# Patient Record
Sex: FEMALE | Race: WHITE | NOT HISPANIC OR LATINO | Employment: UNEMPLOYED | URBAN - METROPOLITAN AREA
[De-identification: names, ages, dates, MRNs, and addresses within clinical notes are randomized per-mention and may not be internally consistent; named-entity substitution may affect disease eponyms.]

---

## 2021-09-28 ENCOUNTER — HOSPITAL ENCOUNTER (OUTPATIENT)
Dept: RADIOLOGY | Facility: MEDICAL CENTER | Age: 51
End: 2021-09-28

## 2021-09-28 ENCOUNTER — APPOINTMENT (OUTPATIENT)
Dept: RADIOLOGY | Facility: MEDICAL CENTER | Age: 51
End: 2021-09-28
Attending: EMERGENCY MEDICINE
Payer: MEDICAID

## 2021-09-28 ENCOUNTER — HOSPITAL ENCOUNTER (OUTPATIENT)
Facility: MEDICAL CENTER | Age: 51
End: 2021-10-01
Attending: EMERGENCY MEDICINE | Admitting: STUDENT IN AN ORGANIZED HEALTH CARE EDUCATION/TRAINING PROGRAM
Payer: MEDICAID

## 2021-09-28 ENCOUNTER — APPOINTMENT (OUTPATIENT)
Dept: RADIOLOGY | Facility: MEDICAL CENTER | Age: 51
End: 2021-09-28
Attending: STUDENT IN AN ORGANIZED HEALTH CARE EDUCATION/TRAINING PROGRAM
Payer: MEDICAID

## 2021-09-28 DIAGNOSIS — R53.1 ACUTE LEFT-SIDED WEAKNESS: Primary | ICD-10-CM

## 2021-09-28 PROBLEM — F32.A DEPRESSION: Status: ACTIVE | Noted: 2021-09-28

## 2021-09-28 PROBLEM — F43.29 STRESS AND ADJUSTMENT REACTION: Status: ACTIVE | Noted: 2021-09-28

## 2021-09-28 PROBLEM — R20.2 PARESTHESIA OF LEFT UPPER AND LOWER EXTREMITY: Status: ACTIVE | Noted: 2021-09-28

## 2021-09-28 PROBLEM — Z86.69 HISTORY OF MIGRAINE HEADACHES: Status: ACTIVE | Noted: 2021-09-28

## 2021-09-28 PROBLEM — G40.909 SEIZURE DISORDER (HCC): Status: ACTIVE | Noted: 2021-09-28

## 2021-09-28 PROBLEM — R29.818 TRANSIENT NEUROLOGICAL SYMPTOMS: Status: ACTIVE | Noted: 2021-09-28

## 2021-09-28 PROBLEM — J44.89 COPD WITH ASTHMA (HCC): Status: ACTIVE | Noted: 2021-09-28

## 2021-09-28 PROBLEM — I50.9 CHF (CONGESTIVE HEART FAILURE) (HCC): Status: ACTIVE | Noted: 2021-09-28

## 2021-09-28 PROBLEM — R07.9 PAIN IN THE CHEST: Status: ACTIVE | Noted: 2021-09-28

## 2021-09-28 PROBLEM — F17.200 SMOKING: Status: ACTIVE | Noted: 2021-09-28

## 2021-09-28 PROBLEM — D75.1 POLYCYTHEMIA: Status: ACTIVE | Noted: 2021-09-28

## 2021-09-28 PROBLEM — R10.12 LEFT UPPER QUADRANT ABDOMINAL PAIN: Status: ACTIVE | Noted: 2021-09-28

## 2021-09-28 LAB
ABO GROUP BLD: NORMAL
APTT PPP: 27.3 SEC (ref 24.7–36)
BASOPHILS # BLD AUTO: 0.8 % (ref 0–1.8)
BASOPHILS # BLD: 0.07 K/UL (ref 0–0.12)
BLD GP AB SCN SERPL QL: NORMAL
EKG IMPRESSION: NORMAL
EOSINOPHIL # BLD AUTO: 0.22 K/UL (ref 0–0.51)
EOSINOPHIL NFR BLD: 2.5 % (ref 0–6.9)
ERYTHROCYTE [DISTWIDTH] IN BLOOD BY AUTOMATED COUNT: 46.7 FL (ref 35.9–50)
GLUCOSE BLD-MCNC: 104 MG/DL (ref 65–99)
HCT VFR BLD AUTO: 51.5 % (ref 37–47)
HGB BLD-MCNC: 17.5 G/DL (ref 12–16)
IMM GRANULOCYTES # BLD AUTO: 0.08 K/UL (ref 0–0.11)
IMM GRANULOCYTES NFR BLD AUTO: 0.9 % (ref 0–0.9)
INR PPP: 0.9 (ref 0.87–1.13)
LYMPHOCYTES # BLD AUTO: 2.68 K/UL (ref 1–4.8)
LYMPHOCYTES NFR BLD: 30.4 % (ref 22–41)
MCH RBC QN AUTO: 31.6 PG (ref 27–33)
MCHC RBC AUTO-ENTMCNC: 34 G/DL (ref 33.6–35)
MCV RBC AUTO: 93.1 FL (ref 81.4–97.8)
MONOCYTES # BLD AUTO: 0.57 K/UL (ref 0–0.85)
MONOCYTES NFR BLD AUTO: 6.5 % (ref 0–13.4)
NEUTROPHILS # BLD AUTO: 5.21 K/UL (ref 2–7.15)
NEUTROPHILS NFR BLD: 58.9 % (ref 44–72)
NRBC # BLD AUTO: 0 K/UL
NRBC BLD-RTO: 0 /100 WBC
PLATELET # BLD AUTO: 218 K/UL (ref 164–446)
PMV BLD AUTO: 11.3 FL (ref 9–12.9)
PROTHROMBIN TIME: 11.9 SEC (ref 12–14.6)
RBC # BLD AUTO: 5.53 M/UL (ref 4.2–5.4)
RH BLD: NORMAL
WBC # BLD AUTO: 8.8 K/UL (ref 4.8–10.8)

## 2021-09-28 PROCEDURE — 99999 PR NO CHARGE: CPT | Performed by: STUDENT IN AN ORGANIZED HEALTH CARE EDUCATION/TRAINING PROGRAM

## 2021-09-28 PROCEDURE — 0042T CT-CEREBRAL PERFUSION ANALYSIS: CPT

## 2021-09-28 PROCEDURE — 71045 X-RAY EXAM CHEST 1 VIEW: CPT

## 2021-09-28 PROCEDURE — 99285 EMERGENCY DEPT VISIT HI MDM: CPT

## 2021-09-28 PROCEDURE — 700111 HCHG RX REV CODE 636 W/ 250 OVERRIDE (IP): Performed by: STUDENT IN AN ORGANIZED HEALTH CARE EDUCATION/TRAINING PROGRAM

## 2021-09-28 PROCEDURE — 93005 ELECTROCARDIOGRAM TRACING: CPT | Performed by: EMERGENCY MEDICINE

## 2021-09-28 PROCEDURE — 86850 RBC ANTIBODY SCREEN: CPT

## 2021-09-28 PROCEDURE — A9270 NON-COVERED ITEM OR SERVICE: HCPCS | Performed by: STUDENT IN AN ORGANIZED HEALTH CARE EDUCATION/TRAINING PROGRAM

## 2021-09-28 PROCEDURE — 93005 ELECTROCARDIOGRAM TRACING: CPT

## 2021-09-28 PROCEDURE — 94760 N-INVAS EAR/PLS OXIMETRY 1: CPT

## 2021-09-28 PROCEDURE — G0378 HOSPITAL OBSERVATION PER HR: HCPCS

## 2021-09-28 PROCEDURE — 99245 OFF/OP CONSLTJ NEW/EST HI 55: CPT | Performed by: PSYCHIATRY & NEUROLOGY

## 2021-09-28 PROCEDURE — 85025 COMPLETE CBC W/AUTO DIFF WBC: CPT

## 2021-09-28 PROCEDURE — 82962 GLUCOSE BLOOD TEST: CPT

## 2021-09-28 PROCEDURE — 86901 BLOOD TYPING SEROLOGIC RH(D): CPT

## 2021-09-28 PROCEDURE — 85730 THROMBOPLASTIN TIME PARTIAL: CPT

## 2021-09-28 PROCEDURE — 700102 HCHG RX REV CODE 250 W/ 637 OVERRIDE(OP): Performed by: STUDENT IN AN ORGANIZED HEALTH CARE EDUCATION/TRAINING PROGRAM

## 2021-09-28 PROCEDURE — 86900 BLOOD TYPING SEROLOGIC ABO: CPT

## 2021-09-28 PROCEDURE — 96374 THER/PROPH/DIAG INJ IV PUSH: CPT | Mod: XU

## 2021-09-28 PROCEDURE — 85610 PROTHROMBIN TIME: CPT

## 2021-09-28 PROCEDURE — 36415 COLL VENOUS BLD VENIPUNCTURE: CPT

## 2021-09-28 PROCEDURE — 96375 TX/PRO/DX INJ NEW DRUG ADDON: CPT | Mod: XU

## 2021-09-28 RX ORDER — ASPIRIN 325 MG
325 TABLET ORAL DAILY
Status: DISCONTINUED | OUTPATIENT
Start: 2021-09-28 | End: 2021-10-01 | Stop reason: HOSPADM

## 2021-09-28 RX ORDER — IBUPROFEN 800 MG/1
800 TABLET ORAL 3 TIMES DAILY PRN
Status: DISCONTINUED | OUTPATIENT
Start: 2021-10-02 | End: 2021-09-29

## 2021-09-28 RX ORDER — LORAZEPAM 0.5 MG/1
0.5 TABLET ORAL EVERY 6 HOURS PRN
COMMUNITY

## 2021-09-28 RX ORDER — PROPRANOLOL HYDROCHLORIDE 20 MG/1
20 TABLET ORAL 2 TIMES DAILY
COMMUNITY

## 2021-09-28 RX ORDER — OMEPRAZOLE 20 MG/1
20 CAPSULE, DELAYED RELEASE ORAL DAILY
Status: DISCONTINUED | OUTPATIENT
Start: 2021-09-29 | End: 2021-10-01 | Stop reason: HOSPADM

## 2021-09-28 RX ORDER — MIRTAZAPINE 15 MG/1
30 TABLET, FILM COATED ORAL NIGHTLY
Status: DISCONTINUED | OUTPATIENT
Start: 2021-09-28 | End: 2021-10-01 | Stop reason: HOSPADM

## 2021-09-28 RX ORDER — PROPRANOLOL HYDROCHLORIDE 10 MG/1
20 TABLET ORAL 2 TIMES DAILY
Status: DISCONTINUED | OUTPATIENT
Start: 2021-09-28 | End: 2021-10-01 | Stop reason: HOSPADM

## 2021-09-28 RX ORDER — LAMOTRIGINE 100 MG/1
100 TABLET ORAL DAILY
Status: DISCONTINUED | OUTPATIENT
Start: 2021-09-29 | End: 2021-10-01 | Stop reason: HOSPADM

## 2021-09-28 RX ORDER — LEVETIRACETAM 1000 MG/1
1000 TABLET ORAL 2 TIMES DAILY
COMMUNITY

## 2021-09-28 RX ORDER — AMOXICILLIN 250 MG
2 CAPSULE ORAL 2 TIMES DAILY
Status: DISCONTINUED | OUTPATIENT
Start: 2021-09-29 | End: 2021-10-01 | Stop reason: HOSPADM

## 2021-09-28 RX ORDER — ACETAMINOPHEN 325 MG/1
650 TABLET ORAL EVERY 6 HOURS PRN
Status: DISCONTINUED | OUTPATIENT
Start: 2021-09-28 | End: 2021-09-28

## 2021-09-28 RX ORDER — IPRATROPIUM BROMIDE AND ALBUTEROL SULFATE 2.5; .5 MG/3ML; MG/3ML
3 SOLUTION RESPIRATORY (INHALATION)
Status: DISCONTINUED | OUTPATIENT
Start: 2021-09-28 | End: 2021-10-01 | Stop reason: HOSPADM

## 2021-09-28 RX ORDER — KETOROLAC TROMETHAMINE 30 MG/ML
30 INJECTION, SOLUTION INTRAMUSCULAR; INTRAVENOUS EVERY 6 HOURS
Status: DISCONTINUED | OUTPATIENT
Start: 2021-09-29 | End: 2021-09-29

## 2021-09-28 RX ORDER — ACETAMINOPHEN 500 MG
1000 TABLET ORAL EVERY 6 HOURS
Status: DISCONTINUED | OUTPATIENT
Start: 2021-09-29 | End: 2021-10-01 | Stop reason: HOSPADM

## 2021-09-28 RX ORDER — MIRTAZAPINE 30 MG/1
30 TABLET, FILM COATED ORAL NIGHTLY
COMMUNITY

## 2021-09-28 RX ORDER — ASPIRIN 81 MG/1
324 TABLET, CHEWABLE ORAL DAILY
Status: DISCONTINUED | OUTPATIENT
Start: 2021-09-28 | End: 2021-10-01 | Stop reason: HOSPADM

## 2021-09-28 RX ORDER — BISACODYL 10 MG
10 SUPPOSITORY, RECTAL RECTAL
Status: DISCONTINUED | OUTPATIENT
Start: 2021-09-28 | End: 2021-10-01 | Stop reason: HOSPADM

## 2021-09-28 RX ORDER — LEVETIRACETAM 500 MG/1
1000 TABLET ORAL 2 TIMES DAILY
Status: DISCONTINUED | OUTPATIENT
Start: 2021-09-28 | End: 2021-10-01 | Stop reason: HOSPADM

## 2021-09-28 RX ORDER — POLYETHYLENE GLYCOL 3350 17 G/17G
1 POWDER, FOR SOLUTION ORAL
Status: DISCONTINUED | OUTPATIENT
Start: 2021-09-28 | End: 2021-10-01 | Stop reason: HOSPADM

## 2021-09-28 RX ORDER — ASPIRIN 300 MG/1
300 SUPPOSITORY RECTAL DAILY
Status: DISCONTINUED | OUTPATIENT
Start: 2021-09-28 | End: 2021-10-01 | Stop reason: HOSPADM

## 2021-09-28 RX ORDER — ACETAMINOPHEN 500 MG
1000 TABLET ORAL EVERY 6 HOURS PRN
Status: DISCONTINUED | OUTPATIENT
Start: 2021-10-04 | End: 2021-10-01 | Stop reason: HOSPADM

## 2021-09-28 RX ORDER — LAMOTRIGINE 100 MG/1
100 TABLET ORAL DAILY
COMMUNITY

## 2021-09-28 RX ORDER — PANTOPRAZOLE SODIUM 40 MG/1
40 TABLET, DELAYED RELEASE ORAL DAILY
Status: ON HOLD | COMMUNITY
End: 2023-03-18 | Stop reason: SDUPTHER

## 2021-09-28 RX ORDER — LORAZEPAM 1 MG/1
0.5 TABLET ORAL EVERY 6 HOURS PRN
Status: DISCONTINUED | OUTPATIENT
Start: 2021-09-28 | End: 2021-10-01 | Stop reason: HOSPADM

## 2021-09-28 RX ADMIN — LORAZEPAM 0.5 MG: 1 TABLET ORAL at 23:35

## 2021-09-28 RX ADMIN — ASPIRIN 325 MG ORAL TABLET 325 MG: 325 PILL ORAL at 23:33

## 2021-09-28 RX ADMIN — PROPRANOLOL HYDROCHLORIDE 20 MG: 10 TABLET ORAL at 23:35

## 2021-09-28 RX ADMIN — ACETAMINOPHEN 1000 MG: 500 TABLET ORAL at 23:34

## 2021-09-28 RX ADMIN — KETOROLAC TROMETHAMINE 30 MG: 30 INJECTION, SOLUTION INTRAMUSCULAR at 23:36

## 2021-09-28 RX ADMIN — MIRTAZAPINE 30 MG: 15 TABLET, FILM COATED ORAL at 23:34

## 2021-09-28 RX ADMIN — LEVETIRACETAM 1000 MG: 500 TABLET, FILM COATED ORAL at 23:40

## 2021-09-28 ASSESSMENT — LIFESTYLE VARIABLES: SUBSTANCE_ABUSE: 0

## 2021-09-28 ASSESSMENT — ENCOUNTER SYMPTOMS
FOCAL WEAKNESS: 1
SHORTNESS OF BREATH: 1
DEPRESSION: 1
MEMORY LOSS: 0
DIARRHEA: 0
HEADACHES: 1
ABDOMINAL PAIN: 0
DIZZINESS: 1
BLURRED VISION: 1
CONSTIPATION: 0
SORE THROAT: 0
WEAKNESS: 1
BRUISES/BLEEDS EASILY: 0
CHILLS: 0
FEVER: 0
COUGH: 0
PALPITATIONS: 0
VOMITING: 0
NAUSEA: 1
DOUBLE VISION: 0
MYALGIAS: 0
NERVOUS/ANXIOUS: 1

## 2021-09-28 ASSESSMENT — PAIN DESCRIPTION - PAIN TYPE: TYPE: ACUTE PAIN

## 2021-09-29 ENCOUNTER — HOSPITAL ENCOUNTER (OUTPATIENT)
Dept: RADIOLOGY | Facility: MEDICAL CENTER | Age: 51
End: 2021-09-29

## 2021-09-29 ENCOUNTER — APPOINTMENT (OUTPATIENT)
Dept: RADIOLOGY | Facility: MEDICAL CENTER | Age: 51
End: 2021-09-29
Attending: STUDENT IN AN ORGANIZED HEALTH CARE EDUCATION/TRAINING PROGRAM
Payer: MEDICAID

## 2021-09-29 ENCOUNTER — APPOINTMENT (OUTPATIENT)
Dept: CARDIOLOGY | Facility: MEDICAL CENTER | Age: 51
End: 2021-09-29
Attending: STUDENT IN AN ORGANIZED HEALTH CARE EDUCATION/TRAINING PROGRAM
Payer: MEDICAID

## 2021-09-29 LAB
ABO + RH BLD: NORMAL
ALBUMIN SERPL BCP-MCNC: 4.3 G/DL (ref 3.2–4.9)
ALBUMIN/GLOB SERPL: 1.7 G/DL
ALP SERPL-CCNC: 88 U/L (ref 30–99)
ALT SERPL-CCNC: 25 U/L (ref 2–50)
ANION GAP SERPL CALC-SCNC: 10 MMOL/L (ref 7–16)
AST SERPL-CCNC: 20 U/L (ref 12–45)
BASOPHILS # BLD AUTO: 1.2 % (ref 0–1.8)
BASOPHILS # BLD: 0.09 K/UL (ref 0–0.12)
BILIRUB SERPL-MCNC: 0.6 MG/DL (ref 0.1–1.5)
BUN SERPL-MCNC: 22 MG/DL (ref 8–22)
CALCIUM SERPL-MCNC: 10.2 MG/DL (ref 8.5–10.5)
CHLORIDE SERPL-SCNC: 103 MMOL/L (ref 96–112)
CHOLEST SERPL-MCNC: 267 MG/DL (ref 100–199)
CO2 SERPL-SCNC: 24 MMOL/L (ref 20–33)
CREAT SERPL-MCNC: 0.77 MG/DL (ref 0.5–1.4)
EOSINOPHIL # BLD AUTO: 0.17 K/UL (ref 0–0.51)
EOSINOPHIL NFR BLD: 2.2 % (ref 0–6.9)
ERYTHROCYTE [DISTWIDTH] IN BLOOD BY AUTOMATED COUNT: 48 FL (ref 35.9–50)
GLOBULIN SER CALC-MCNC: 2.6 G/DL (ref 1.9–3.5)
GLUCOSE SERPL-MCNC: 90 MG/DL (ref 65–99)
HCT VFR BLD AUTO: 50.1 % (ref 37–47)
HDLC SERPL-MCNC: 86 MG/DL
HGB BLD-MCNC: 17 G/DL (ref 12–16)
IMM GRANULOCYTES # BLD AUTO: 0.08 K/UL (ref 0–0.11)
IMM GRANULOCYTES NFR BLD AUTO: 1 % (ref 0–0.9)
LDLC SERPL CALC-MCNC: 135 MG/DL
LIPASE SERPL-CCNC: 65 U/L (ref 11–82)
LV EJECT FRACT  99904: 60
LV EJECT FRACT MOD 2C 99903: 62.57
LV EJECT FRACT MOD 4C 99902: 66.33
LV EJECT FRACT MOD BP 99901: 65.64
LYMPHOCYTES # BLD AUTO: 2.9 K/UL (ref 1–4.8)
LYMPHOCYTES NFR BLD: 38.1 % (ref 22–41)
MAGNESIUM SERPL-MCNC: 2.2 MG/DL (ref 1.5–2.5)
MCH RBC QN AUTO: 31.9 PG (ref 27–33)
MCHC RBC AUTO-ENTMCNC: 33.9 G/DL (ref 33.6–35)
MCV RBC AUTO: 94 FL (ref 81.4–97.8)
MONOCYTES # BLD AUTO: 0.6 K/UL (ref 0–0.85)
MONOCYTES NFR BLD AUTO: 7.9 % (ref 0–13.4)
NEUTROPHILS # BLD AUTO: 3.78 K/UL (ref 2–7.15)
NEUTROPHILS NFR BLD: 49.6 % (ref 44–72)
NRBC # BLD AUTO: 0 K/UL
NRBC BLD-RTO: 0 /100 WBC
NT-PROBNP SERPL IA-MCNC: 6 PG/ML (ref 0–125)
PLATELET # BLD AUTO: 198 K/UL (ref 164–446)
PMV BLD AUTO: 10.4 FL (ref 9–12.9)
POTASSIUM SERPL-SCNC: 4.3 MMOL/L (ref 3.6–5.5)
PROT SERPL-MCNC: 6.9 G/DL (ref 6–8.2)
RBC # BLD AUTO: 5.33 M/UL (ref 4.2–5.4)
SODIUM SERPL-SCNC: 137 MMOL/L (ref 135–145)
TRIGL SERPL-MCNC: 231 MG/DL (ref 0–149)
TROPONIN T SERPL-MCNC: <6 NG/L (ref 6–19)
TROPONIN T SERPL-MCNC: <6 NG/L (ref 6–19)
WBC # BLD AUTO: 7.6 K/UL (ref 4.8–10.8)

## 2021-09-29 PROCEDURE — 700111 HCHG RX REV CODE 636 W/ 250 OVERRIDE (IP)

## 2021-09-29 PROCEDURE — G0378 HOSPITAL OBSERVATION PER HR: HCPCS

## 2021-09-29 PROCEDURE — 83880 ASSAY OF NATRIURETIC PEPTIDE: CPT

## 2021-09-29 PROCEDURE — 99214 OFFICE O/P EST MOD 30 MIN: CPT | Performed by: NURSE PRACTITIONER

## 2021-09-29 PROCEDURE — 97161 PT EVAL LOW COMPLEX 20 MIN: CPT

## 2021-09-29 PROCEDURE — 94640 AIRWAY INHALATION TREATMENT: CPT

## 2021-09-29 PROCEDURE — 83690 ASSAY OF LIPASE: CPT

## 2021-09-29 PROCEDURE — 93306 TTE W/DOPPLER COMPLETE: CPT | Mod: 26 | Performed by: INTERNAL MEDICINE

## 2021-09-29 PROCEDURE — 84484 ASSAY OF TROPONIN QUANT: CPT

## 2021-09-29 PROCEDURE — 700111 HCHG RX REV CODE 636 W/ 250 OVERRIDE (IP): Performed by: STUDENT IN AN ORGANIZED HEALTH CARE EDUCATION/TRAINING PROGRAM

## 2021-09-29 PROCEDURE — 96372 THER/PROPH/DIAG INJ SC/IM: CPT | Mod: XU

## 2021-09-29 PROCEDURE — 96375 TX/PRO/DX INJ NEW DRUG ADDON: CPT | Mod: XU

## 2021-09-29 PROCEDURE — 93306 TTE W/DOPPLER COMPLETE: CPT

## 2021-09-29 PROCEDURE — 94762 N-INVAS EAR/PLS OXIMTRY CONT: CPT

## 2021-09-29 PROCEDURE — 80053 COMPREHEN METABOLIC PANEL: CPT

## 2021-09-29 PROCEDURE — 83735 ASSAY OF MAGNESIUM: CPT

## 2021-09-29 PROCEDURE — 85025 COMPLETE CBC W/AUTO DIFF WBC: CPT

## 2021-09-29 PROCEDURE — 93005 ELECTROCARDIOGRAM TRACING: CPT | Performed by: STUDENT IN AN ORGANIZED HEALTH CARE EDUCATION/TRAINING PROGRAM

## 2021-09-29 PROCEDURE — 74018 RADEX ABDOMEN 1 VIEW: CPT

## 2021-09-29 PROCEDURE — 96376 TX/PRO/DX INJ SAME DRUG ADON: CPT | Mod: XU

## 2021-09-29 PROCEDURE — 700101 HCHG RX REV CODE 250: Performed by: STUDENT IN AN ORGANIZED HEALTH CARE EDUCATION/TRAINING PROGRAM

## 2021-09-29 PROCEDURE — A9270 NON-COVERED ITEM OR SERVICE: HCPCS | Performed by: STUDENT IN AN ORGANIZED HEALTH CARE EDUCATION/TRAINING PROGRAM

## 2021-09-29 PROCEDURE — A9502 TC99M TETROFOSMIN: HCPCS

## 2021-09-29 PROCEDURE — 99226 PR SUBSEQUENT OBSERVATION CARE,LEVEL III: CPT | Performed by: STUDENT IN AN ORGANIZED HEALTH CARE EDUCATION/TRAINING PROGRAM

## 2021-09-29 PROCEDURE — 92610 EVALUATE SWALLOWING FUNCTION: CPT

## 2021-09-29 PROCEDURE — 70551 MRI BRAIN STEM W/O DYE: CPT

## 2021-09-29 PROCEDURE — 97166 OT EVAL MOD COMPLEX 45 MIN: CPT

## 2021-09-29 PROCEDURE — 700102 HCHG RX REV CODE 250 W/ 637 OVERRIDE(OP): Performed by: STUDENT IN AN ORGANIZED HEALTH CARE EDUCATION/TRAINING PROGRAM

## 2021-09-29 PROCEDURE — 700117 HCHG RX CONTRAST REV CODE 255: Performed by: STUDENT IN AN ORGANIZED HEALTH CARE EDUCATION/TRAINING PROGRAM

## 2021-09-29 PROCEDURE — 80061 LIPID PANEL: CPT

## 2021-09-29 RX ORDER — MORPHINE SULFATE 4 MG/ML
2 INJECTION, SOLUTION INTRAMUSCULAR; INTRAVENOUS EVERY 4 HOURS PRN
Status: DISCONTINUED | OUTPATIENT
Start: 2021-09-29 | End: 2021-10-01

## 2021-09-29 RX ORDER — REGADENOSON 0.08 MG/ML
0.4 INJECTION, SOLUTION INTRAVENOUS ONCE
Status: COMPLETED | OUTPATIENT
Start: 2021-09-29 | End: 2021-09-29

## 2021-09-29 RX ORDER — REGADENOSON 0.08 MG/ML
INJECTION, SOLUTION INTRAVENOUS
Status: COMPLETED
Start: 2021-09-29 | End: 2021-09-29

## 2021-09-29 RX ORDER — AMINOPHYLLINE 25 MG/ML
100 INJECTION, SOLUTION INTRAVENOUS
Status: DISCONTINUED | OUTPATIENT
Start: 2021-09-29 | End: 2021-10-01 | Stop reason: HOSPADM

## 2021-09-29 RX ORDER — ONDANSETRON 2 MG/ML
4 INJECTION INTRAMUSCULAR; INTRAVENOUS EVERY 4 HOURS PRN
Status: DISCONTINUED | OUTPATIENT
Start: 2021-09-29 | End: 2021-10-01 | Stop reason: HOSPADM

## 2021-09-29 RX ADMIN — ONDANSETRON 4 MG: 2 INJECTION INTRAMUSCULAR; INTRAVENOUS at 19:46

## 2021-09-29 RX ADMIN — MORPHINE SULFATE 2 MG: 4 INJECTION INTRAVENOUS at 19:53

## 2021-09-29 RX ADMIN — ENOXAPARIN SODIUM 40 MG: 40 INJECTION SUBCUTANEOUS at 05:30

## 2021-09-29 RX ADMIN — KETOROLAC TROMETHAMINE 30 MG: 30 INJECTION, SOLUTION INTRAMUSCULAR at 05:30

## 2021-09-29 RX ADMIN — LEVETIRACETAM 1000 MG: 500 TABLET, FILM COATED ORAL at 17:18

## 2021-09-29 RX ADMIN — ACETAMINOPHEN 1000 MG: 500 TABLET ORAL at 10:40

## 2021-09-29 RX ADMIN — LAMOTRIGINE 100 MG: 100 TABLET ORAL at 10:40

## 2021-09-29 RX ADMIN — ONDANSETRON 4 MG: 2 INJECTION INTRAMUSCULAR; INTRAVENOUS at 09:35

## 2021-09-29 RX ADMIN — LORAZEPAM 0.5 MG: 1 TABLET ORAL at 07:19

## 2021-09-29 RX ADMIN — REGADENOSON 0.4 MG: 0.08 INJECTION, SOLUTION INTRAVENOUS at 14:35

## 2021-09-29 RX ADMIN — ACETAMINOPHEN 1000 MG: 500 TABLET ORAL at 17:18

## 2021-09-29 RX ADMIN — PROPRANOLOL HYDROCHLORIDE 20 MG: 10 TABLET ORAL at 17:18

## 2021-09-29 RX ADMIN — MORPHINE SULFATE 2 MG: 4 INJECTION INTRAVENOUS at 10:40

## 2021-09-29 RX ADMIN — MORPHINE SULFATE 2 MG: 4 INJECTION INTRAVENOUS at 15:31

## 2021-09-29 RX ADMIN — PROPRANOLOL HYDROCHLORIDE 20 MG: 10 TABLET ORAL at 10:44

## 2021-09-29 RX ADMIN — LEVETIRACETAM 1000 MG: 500 TABLET, FILM COATED ORAL at 10:40

## 2021-09-29 RX ADMIN — LORAZEPAM 0.5 MG: 1 TABLET ORAL at 23:31

## 2021-09-29 RX ADMIN — LORAZEPAM 0.5 MG: 1 TABLET ORAL at 15:37

## 2021-09-29 RX ADMIN — ONDANSETRON 4 MG: 2 INJECTION INTRAMUSCULAR; INTRAVENOUS at 04:07

## 2021-09-29 RX ADMIN — IPRATROPIUM BROMIDE 0.5 MG: 0.5 SOLUTION RESPIRATORY (INHALATION) at 10:16

## 2021-09-29 RX ADMIN — MIRTAZAPINE 30 MG: 15 TABLET, FILM COATED ORAL at 20:39

## 2021-09-29 RX ADMIN — HUMAN ALBUMIN MICROSPHERES AND PERFLUTREN 3 ML: 10; .22 INJECTION, SOLUTION INTRAVENOUS at 14:15

## 2021-09-29 RX ADMIN — OMEPRAZOLE 20 MG: 20 CAPSULE, DELAYED RELEASE ORAL at 10:41

## 2021-09-29 ASSESSMENT — COGNITIVE AND FUNCTIONAL STATUS - GENERAL
HELP NEEDED FOR BATHING: A LITTLE
TURNING FROM BACK TO SIDE WHILE IN FLAT BAD: A LITTLE
DRESSING REGULAR LOWER BODY CLOTHING: A LITTLE
DAILY ACTIVITIY SCORE: 21
SUGGESTED CMS G CODE MODIFIER MOBILITY: CK
MOVING FROM LYING ON BACK TO SITTING ON SIDE OF FLAT BED: A LITTLE
TOILETING: A LITTLE
STANDING UP FROM CHAIR USING ARMS: A LITTLE
MOBILITY SCORE: 18
MOVING TO AND FROM BED TO CHAIR: A LITTLE
SUGGESTED CMS G CODE MODIFIER DAILY ACTIVITY: CJ
CLIMB 3 TO 5 STEPS WITH RAILING: A LITTLE
WALKING IN HOSPITAL ROOM: A LITTLE

## 2021-09-29 ASSESSMENT — PAIN DESCRIPTION - PAIN TYPE
TYPE: ACUTE PAIN

## 2021-09-29 ASSESSMENT — PATIENT HEALTH QUESTIONNAIRE - PHQ9
1. LITTLE INTEREST OR PLEASURE IN DOING THINGS: NOT AT ALL
2. FEELING DOWN, DEPRESSED, IRRITABLE, OR HOPELESS: NOT AT ALL
SUM OF ALL RESPONSES TO PHQ9 QUESTIONS 1 AND 2: 0

## 2021-09-29 ASSESSMENT — LIFESTYLE VARIABLES: EVER_SMOKED: YES

## 2021-09-29 ASSESSMENT — ENCOUNTER SYMPTOMS
SHORTNESS OF BREATH: 1
NAUSEA: 1
NERVOUS/ANXIOUS: 1

## 2021-09-29 ASSESSMENT — GAIT ASSESSMENTS: GAIT LEVEL OF ASSIST: MINIMAL ASSIST

## 2021-09-29 ASSESSMENT — COPD QUESTIONNAIRES
DURING THE PAST 4 WEEKS HOW MUCH DID YOU FEEL SHORT OF BREATH: NONE/LITTLE OF THE TIME
COPD SCREENING SCORE: 3
DO YOU EVER COUGH UP ANY MUCUS OR PHLEGM?: NO/ONLY WITH OCCASIONAL COLDS OR INFECTIONS
HAVE YOU SMOKED AT LEAST 100 CIGARETTES IN YOUR ENTIRE LIFE: YES

## 2021-09-29 ASSESSMENT — ACTIVITIES OF DAILY LIVING (ADL): TOILETING: INDEPENDENT

## 2021-09-29 NOTE — ASSESSMENT & PLAN NOTE
As per history history of migraine headaches.  Continue to have some headache.  Complex migraine is possible cause of patient's symptoms.    Continue neurochecks every 4 hours.  Scheduled Tylenol and trial of Toradol IV.

## 2021-09-29 NOTE — ASSESSMENT & PLAN NOTE
Etiology unclear.  Question possibility of obstructive sleep apnea/asthma/copd  Nocturnal pulse oximetry.  Cont monitoring

## 2021-09-29 NOTE — THERAPY
"Speech Language Pathology   Clinical Swallow Evaluation     Patient Name: Shaista Pichardo  AGE:  51 y.o., SEX:  female  Medical Record #: [id number]  Today's Date: 9/29/2021     Precautions  Precautions: (P) Fall Risk, Swallow Precautions ( See Comments)    Assessment    51 y.o. female who presented 9/28/2021 with left-sided weakness with paresthesias, left upper quadrant pain, and chest pain.PMHx: COPD/asthma, active smoking, CHF, migraine headaches, epilepsy on Keppra and lamotrigine, anxiety, and depression.  Brain MRI within normal limits.  CXR: No acute cardiopulmonary abnormalities are identified. CT-Cerebral Blood Perfusion: Cerebral blood flow less than 30% likely representing completed infarct.    Pt was AAOx4, voice was strong and clear.  Pt reports she has a history of globus sensation with swallowing, as well as \"food coming back up into her mouth\", after she swallows.  Pt reports this occurs with solid food only, and not liquids or purees.  No gross deficits were noted in oral exam, however cough was minimally weak.  Laryngeal elevation was presumed complete upon palpation.  Pt was given PO trials of ice chips, thins by tsp, cup and straw sip, puree, and soft and bite sized solids.  Pt consumed ice, thins, puree and soft solids without s/sx of aspiration. However, pt had a delayed gagging after the swallow with soft solids x2, reporting food \"came back up\" and then she swallowed again.  Pt encouraged to chew food more thoroughly, which reduced gagging.  Pt's voice was clear throughout evaluation, and swallow trigger was timely with all PO trials.  Pt declined trials of any further textures, reporting pain and fatigue. Given gagging and reports of globus sensation, recommend an Esophagram to further evaluate esophageal functioning.  Of note, RN reports pt took large pills with no difficulty, washed with water s/p evaluation. Recommend to start a diet of soft and bite sized solids, as pt declining pureed " "diet, with use of swallow precautions.  SLP continues to follow.    Plan  1) Start soft and bite sized diet with thins (SB6/TN0)  2) Swallow precautions:   - sit up at 90 degrees   - swallow multiple times   - take small bites and sips   - chew solids thoroughly  3) Consider esophagram to further evaluate esophageal functioning   4) Discontinue PO with any difficulty    Recommend Speech Therapy 3 times per week until therapy goals are met for the following treatments:  Dysphagia Training and Patient / Family / Caregiver Education.    Discharge Recommendations: To be determined closer to discharge.      Objective     09/29/21 1215   Prior Living Situation   Prior Services None   Housing / Facility 1 Story House   Lives with - Patient's Self Care Capacity Adult Children   Comments pt lives with her 32yo son. reports son is able to assist as needed. pt indep with mobility and ADLs prior.    Prior Level Of Function   Swallow Impaired  (pt reports history of \"food sticking\")   Dentition Poor Quality    Dentures Uppers   Hearing Within Functional Limits for Evaluation   Hearing Aid None   Vision Within Functional Limits for Evaluation   Patient's Primary Language English   Oral Motor Eval    Is Patient Able to Complete Oral Motor Eval Yes, Within Normal Limits   Laryngeal Function   Voice Quality Within Functional Limits   Volutional Cough Minimal   Excursion Upon Swallow Complete   Oral Food Presentation   Ice Chips Within Functional Limits   Single Swallow Thin (0) Within Functional Limits   Serial Swallow Thin (0) Within Functional Limits   Liquidised (3) Within Functional Limits   Pureed (4) Within Functional Limits   Soft & Bite-Sized (6) - (Dysphagia III) Minimal   Regular (7) Not Tested  (pt declined)   Self Feeding Independent   Tracheostomy   Tracheostomy  No   Dysphagia Strategies / Recommendations   Strategies / Interventions Recommended (Yes / No) Yes   Diet / Liquid Recommendation Thin (0);Soft & Bite-Sized " (6) - (Dysphagia III)   Medication Administration  Whole with Liquid Wash   Therapy Interventions Dysphagia Therapy By Speech Language Pathologist   Dysphagia Rating   Nutritional Liquid Intake Rating Scale Non thickened beverages   Nutritional Food Intake Rating Scale Total oral diet with multiple consistencies but requiring special preparation or compensations

## 2021-09-29 NOTE — PROGRESS NOTES
Neurology Progress Note  Neurohospitalist Service, [facility]    Referring Physician: Roselyn Chappell M.D.    Chief Complaint   Patient presents with   • Weakness     transfer from [facility]; LKW 1330 pt w/ L sided wkns, numbness and tingling, L side facial droop. hx TIA, chest pain pressure radiating to back. NIH score 9        HPI: Refer to initial documented Neurology H&P, as detailed in the patient's chart.    Interval History 9/29: No acute events overnight. NIHSS 1 this morning for complaint of decreased sensation in left, face, arm and leg. MRI is negative for infarct.    Review of systems: In addition to what is detailed in the HPI and/or updated in the interval history, all other systems reviewed and are negative.    Past Medical History:    has a past medical history of Anxiety, Asthma, Chronic obstructive pulmonary disease (HCC), Congestive heart failure (HCC), Depression, Epilepsy (HCC), Migraine, and TIA (transient ischemic attack).    FHx:  family history includes Heart Attack in her father, maternal grandmother, and mother; Stroke in her maternal grandmother.    SHx:   reports that she has been smoking cigarettes. She has never used smokeless tobacco. She reports that she does not drink alcohol and does not use drugs.    Medications:    Current Facility-Administered Medications:   •  ipratropium (ATROVENT) 0.02 % nebulizer solution 0.5 mg, 0.5 mg, Nebulization, 4X/DAY (RT), Homar Clemente M.D., 0.5 mg at 09/29/21 1016  •  ondansetron (ZOFRAN) syringe/vial injection 4 mg, 4 mg, Intravenous, Q4HRS PRN, Homar Clemente M.D., 4 mg at 09/29/21 0935  •  morphine (pf) 4 mg/mL injection 2 mg, 2 mg, Intravenous, Q4HRS PRN, Roselyn Chappell M.D., 2 mg at 09/29/21 1040  •  lamoTRIgine (LAMICTAL) tablet 100 mg, 100 mg, Oral, DAILY, Homar Clemente M.D., 100 mg at 09/29/21 1040  •  levETIRAcetam (KEPPRA) tablet 1,000 mg, 1,000 mg, Oral, BID, Homar Clemente M.D., 1,000 mg at 09/29/21 1040  •   LORazepam (ATIVAN) tablet 0.5 mg, 0.5 mg, Oral, Q6HRS PRN, Homar Clemente M.D., 0.5 mg at 09/29/21 0719  •  mirtazapine (Remeron) tablet 30 mg, 30 mg, Oral, Nightly, Homar Clemente M.D., 30 mg at 09/28/21 2334  •  omeprazole (PRILOSEC) capsule 20 mg, 20 mg, Oral, DAILY, Homar Clemente M.D., 20 mg at 09/29/21 1041  •  propranolol (INDERAL) tablet 20 mg, 20 mg, Oral, BID, Homar Clemente M.D., 20 mg at 09/29/21 1044  •  enoxaparin (LOVENOX) inj 40 mg, 40 mg, Subcutaneous, DAILY, Homar Clemente M.D., 40 mg at 09/29/21 0530  •  senna-docusate (PERICOLACE or SENOKOT S) 8.6-50 MG per tablet 2 Tablet, 2 Tablet, Oral, BID **AND** polyethylene glycol/lytes (MIRALAX) PACKET 1 Packet, 1 Packet, Oral, QDAY PRN **AND** magnesium hydroxide (MILK OF MAGNESIA) suspension 30 mL, 30 mL, Oral, QDAY PRN **AND** bisacodyl (DULCOLAX) suppository 10 mg, 10 mg, Rectal, QDAY PRN, Homar Clemente M.D.  •  Nicotine Replacement Patient Education Materials, , , Once **AND** nicotine polacrilex (NICORETTE) 2 MG piece 2 mg, 2 mg, Oral, Q HOUR PRN, Homar Clemente M.D.  •  aspirin (ASA) tablet 325 mg, 325 mg, Oral, DAILY, 325 mg at 09/28/21 2333 **OR** aspirin (ASA) chewable tab 324 mg, 324 mg, Oral, DAILY **OR** aspirin (ASA) suppository 300 mg, 300 mg, Rectal, DAILY, Homar Clemente M.D.  •  Pharmacy Consult Request ...Pain Management Review 1 Each, 1 Each, Other, PHARMACY TO DOSE, Homar Clemente M.D.  •  acetaminophen (TYLENOL) tablet 1,000 mg, 1,000 mg, Oral, Q6HRS, 1,000 mg at 09/29/21 1040 **FOLLOWED BY** [START ON 10/4/2021] acetaminophen (TYLENOL) tablet 1,000 mg, 1,000 mg, Oral, Q6HRS PRN, Homar Clemente M.D.  •  ketorolac (TORADOL) injection 30 mg, 30 mg, Intravenous, Q6HRS, 30 mg at 09/29/21 0530 **FOLLOWED BY** [START ON 10/2/2021] ibuprofen (MOTRIN) tablet 800 mg, 800 mg, Oral, TID PRN, Homar Clemente M.D.  •  ipratropium-albuterol (DUONEB) nebulizer solution, 3 mL, Nebulization, Q4H PRN (RT), Homar Clemente M.D.  •  Respiratory Therapy Consult, , Nebulization,  Continuous RT, Homar Clemente M.D.    Current Outpatient Medications:   •  levetiracetam (KEPPRA) 1000 MG tablet, Take 1,000 mg by mouth 2 times a day., Disp: , Rfl:   •  lamoTRIgine (LAMICTAL) 100 MG Tab, Take 100 mg by mouth every day., Disp: , Rfl:   •  propranolol (INDERAL) 20 MG Tab, Take 20 mg by mouth 2 times a day., Disp: , Rfl:   •  mirtazapine (REMERON) 30 MG Tab tablet, Take 30 mg by mouth every evening., Disp: , Rfl:   •  aspirin EC (ECOTRIN) 81 MG Tablet Delayed Response, Take 81 mg by mouth every day., Disp: , Rfl:   •  LORazepam (ATIVAN) 0.5 MG Tab, Take 0.5 mg by mouth every 6 hours as needed for Anxiety., Disp: , Rfl:   •  ipratropium (ATROVENT) 17 MCG/ACT Aero Soln, Inhale 2 Puffs 4 times a day as needed (shortness of breath)., Disp: , Rfl:   •  pantoprazole (PROTONIX) 40 MG Tablet Delayed Response, Take 40 mg by mouth every day., Disp: , Rfl:     Physical Examination:     Vitals:    09/29/21 0600 09/29/21 0646 09/29/21 0700 09/29/21 1018   BP: (!) 97/73 118/81 102/68    Pulse: 78 79 80 85   Resp: 13 (!) 24 18 18   Temp:   36.7 °C (98 °F)    TempSrc:   Temporal    SpO2: 92% 95% 93% 93%   Weight:       Height:           General: Patient asleep, rouses briskly to voice.  Eyes: examination of optic disks not indicated at this time  CV: RRR    NEUROLOGICAL EXAM:     Mental status: Awake, alert and fully oriented, follows commands  Speech and language: speech is not dysarthric. The patient is able to name and repeat.  Cranial nerve exam: Pupils are equal, round and reactive to light bilaterally. Visual fields are full. Extraocular muscles are intact. Reports sensation in the face is decreased on the left. Face is symmetric. Hearing to finger rub equal. Palate elevates symmetrically. Shoulder shrug is full. Tongue is midline.  Motor exam: Strength is 5/5 in all extremities both distally and proximally. Tone is normal. No abnormal movements were seen on exam.  Sensory exam: Decreased sensation on the  left.  Deep tendon reflexes: deferred  Coordination: no ataxia with finger to nose or heel to shin  Gait: deferred    Objective Data:    Labs:  Lab Results   Component Value Date/Time    PROTHROMBTM 11.9 (L) 09/28/2021 08:54 PM    INR 0.90 09/28/2021 08:54 PM      Lab Results   Component Value Date/Time    WBC 7.6 09/29/2021 03:35 AM    RBC 5.33 09/29/2021 03:35 AM    HEMOGLOBIN 17.0 (H) 09/29/2021 03:35 AM    HEMATOCRIT 50.1 (H) 09/29/2021 03:35 AM    MCV 94.0 09/29/2021 03:35 AM    MCH 31.9 09/29/2021 03:35 AM    MCHC 33.9 09/29/2021 03:35 AM    MPV 10.4 09/29/2021 03:35 AM    NEUTSPOLYS 49.60 09/29/2021 03:35 AM    LYMPHOCYTES 38.10 09/29/2021 03:35 AM    MONOCYTES 7.90 09/29/2021 03:35 AM    EOSINOPHILS 2.20 09/29/2021 03:35 AM    BASOPHILS 1.20 09/29/2021 03:35 AM      Lab Results   Component Value Date/Time    SODIUM 137 09/29/2021 03:35 AM    POTASSIUM 4.3 09/29/2021 03:35 AM    CHLORIDE 103 09/29/2021 03:35 AM    CO2 24 09/29/2021 03:35 AM    GLUCOSE 90 09/29/2021 03:35 AM    BUN 22 09/29/2021 03:35 AM    CREATININE 0.77 09/29/2021 03:35 AM      Lab Results   Component Value Date/Time    CHOLSTRLTOT 267 (H) 09/29/2021 03:35 AM     (H) 09/29/2021 03:35 AM    HDL 86 09/29/2021 03:35 AM    TRIGLYCERIDE 231 (H) 09/29/2021 03:35 AM       Lab Results   Component Value Date/Time    ALKPHOSPHAT 88 09/29/2021 03:35 AM    ASTSGOT 20 09/29/2021 03:35 AM    ALTSGPT 25 09/29/2021 03:35 AM    TBILIRUBIN 0.6 09/29/2021 03:35 AM        Imaging/Testing:    I interpreted and/or reviewed the patient's neuroimaging    EC-ECHOCARDIOGRAM COMPLETE W/ CONT   Final Result      BV-IDEVLUU-2 VIEW   Final Result         No specific finding to suggest small bowel obstruction.      MR-BRAIN-W/O   Final Result         Brain MRI within normal limits.      OUTSIDE IMAGES-CT CHEST   Final Result      CT-FOREIGN FILM CAT SCAN   Final Result      OUTSIDE IMAGES-CT HEAD   Final Result      DX-CHEST-PORTABLE (1 VIEW)   Final Result          1. No acute cardiopulmonary abnormalities are identified.      CT-CEREBRAL PERFUSION ANALYSIS   Final Result      1.  Cerebral blood flow less than 30% likely representing completed infarct = 0 mL.      2.  T Max more than 6 seconds likely representing combination of completed infarct and ischemia = 0 mL.      3.  Mismatched volume likely representing ischemic brain/penumbra = None      4.  Please note that the cerebral perfusion was performed on the limited brain tissue around the basal ganglia region. Infarct/ischemia outside the CT perfusion sections can be missed in this study.      OUTSIDE IMAGES-CT HEAD   Final Result      NM-CARDIAC STRESS TEST    (Results Pending)       Assessment and Plan:    Shaista Pichardo is a 51 y.o. female with history of COPD, CHF, migraine, and TIA who presentied 9/28/21 as a transfer from [facility] with concern for stroke, patient with left sided weakness and sensory changes. Prior to her transfer, neurology at [facility] was contacted and TPA was recommended. For unclear reasons, this was deferred at the referring hospital and patient was sent here for further evaluation. CT perfusion on arrival without mismatch. MRI Brain is unremarkable. Exam has improved today. There is no need to continue aspirin for secondary stroke prevention. No further recommendations or further studies from an acute neurological standpoint at this time. Please re-consult if you have further questions or there is a change in status.    The evaluation of the patient, and recommended management, was discussed with attending neurologist, Dr. Iqra Siddiqui. I have performed a physical exam and reviewed and updated ROS and Plan today (9/29/2021). In review of yesterday's note (9/28/2021), there are no changes except as documented above.    Corinne E. Canavero, APRN  Neurohospitalist APRN, Acute Care Services

## 2021-09-29 NOTE — ED TRIAGE NOTES
"Chief Complaint   Patient presents with   • Weakness     transfer from [facility]; LKW 1330 pt w/ L sided wkns, numbness and tingling, L side facial droop. hx TIA, chest pain pressure radiating to back. NIH score 9        BIB EMS to R4, pt on monitor and in gown, labs drawn and sent. Pt consists of: above complaint, TPA not given to pt d/t pending ct abd d/t r/o aortic dissection per careflight. Pt A&Ox3, disoriented to time, RA    Medications given en route: 100 mcg fentanyl, 4 mg zofran, 0.5 mg versed    Ht 1.651 m (5' 5\")   Wt 90.4 kg (199 lb 4.7 oz)   BMI 33.16 kg/m²   "

## 2021-09-29 NOTE — ASSESSMENT & PLAN NOTE
Exertional at times.  From family history mother and father with MI.    HEART score 3 points, risk of MACE of 0.9 to 1.7%.    Continuous telemetry monitoring.  Serial troponins and EKG.  Echocardiogram.normal  Stress test ordered: normal

## 2021-09-29 NOTE — ASSESSMENT & PLAN NOTE
Etiology of patient is recurrent acute left-sided weakness is unclear.  Differential diagnosis includes polycythemia, ischemic stroke, TIA, complex migraine headache, and psychologically induced transient neurological event.  Patient's left-sided weakness is improving in the ER.  Patient has been under a lot of stress recently with her move.     CT head, CT perfusion and Mri brain neg for acute stroke  Fall, aspiration, and seizure precautions.  If MRI is negative for acute stroke, recommended to the patient to see a counselor to discuss her life stressors.

## 2021-09-29 NOTE — ASSESSMENT & PLAN NOTE
Patient has a 25-pack-year history of smoking with COPD.  She started smoking again 2 weeks ago smoking 1 cigarette a day.  She is interested in quitting.    I counseled the patient for 6 minutes regarding smoking cessation using methods such as nicotine replacement therapy with patches and gum and medications such as Wellbutrin or Chantix.  Unfortunately, patient has history of seizures and Wellbutrin is contraindicated. I also discussed with her breathing techniques and meditation, which her assist her with smoking cessation.  Patient is agreeable to trial of nicotine replacement therapy with nicotine gum.    Start nicotine replacement therapy gum.

## 2021-09-29 NOTE — DISCHARGE PLANNING
[facility] Transitional Care Coordination    Referral from:  Dr. Clemente    Insurance Provider on Facesheet: [employer].  Unfortunately, [facility] is not a benefit.  Anticipate post acute services in [place] if needed.    Potential Rehab Diagnosis: CVA?    Chart review indicates patient may have on going medical management and may have therapy needs to possibly meet inpatient rehab facility criteria with the goal of returning to community.    D/C support: TBD     Physiatry consultation denied per protocol.     CVA?   [employer].  Unfortunately, [facility] is not a benefit.  Anticipate post acute services in [place] if needed.  This referral will not be forwarded to Physiatry.  [facility] will not follow.  Please reach out to myself @ [phone] with any questions.     Thank you for the referral.

## 2021-09-29 NOTE — DISCHARGE PLANNING
Referral: Stroke    Intervention: SW met with Pt who is a transfer from [facility].    Plan: SW met with Pt . She states she is [place] and her son is aware she is here.   Pt son is Toi [phone].  Pt stated he did not need to be called at this time.

## 2021-09-29 NOTE — ASSESSMENT & PLAN NOTE
Recent stressor with moving from [place] to [place], [place] to be with her older son.    I discussed the patient that if imaging studies are negative and there is no clear cause of her neurological symptoms concerning for ischemic events, I recommended to her to consider getting counseling to discuss her life stressors.    Continue home mirtazapine.

## 2021-09-29 NOTE — PROGRESS NOTES
"Hospital Medicine Daily Progress Note    Date of Service  9/29/2021    Chief Complaint  Shaista Pichardo is a 51 y.o. female admitted 9/28/2021 with L sided weakness and paresthesias and chest pain    Hospital Course  \"51 y.o. female who presented 9/28/2021 with left-sided weakness with paresthesias, left upper quadrant pain, and chest pain.  This is a pleasant woman with a history of COPD/asthma, active smoking, CHF, migraine headaches, epilepsy on Keppra and lamotrigine, anxiety, and depression.  She was transferred from [facility] for higher level of care due to concern for stroke.  Dr. Siddiqui of [facility] neurology was consulted prior to the transfer and had recommended TPA.  However, due to concern for her left upper quadrant pain being related to a possible aortic dissection, the outside hospital did not administer TPA and transfer the patient to [facility] while the CTA waiting was pending.  There was no dissection or cerebral occlusion noted on the CTA.  CT perfusion study performed here did not show any significant mismatch and was symmetric.\"  Mri brain normal.  Reports L chest pain associated with sob and nausea. Trop trending negative. Echo pending. Ordered stress test    Interval Problem Update  Patient is seen at the bedside. Reports L chest pain, associated with sob and nausea. Reports she has had stress test in the past. No relieved with Toradol.   Brain Mri neg. Neurology signed off.   Echo pending  Stress test ordered    I have personally seen and examined the patient at bedside. I discussed the plan of care with patient and bedside RN.    Consultants/Specialty  neurology    Code Status  Full Code    Disposition  Patient is not medically cleared.   Anticipate discharge to to home with close outpatient follow-up.  I have placed the appropriate orders for post-discharge needs.    Review of Systems  Review of Systems   Constitutional: Positive for malaise/fatigue.   Respiratory: Positive for " shortness of breath.    Cardiovascular: Positive for chest pain.   Gastrointestinal: Positive for nausea.   Psychiatric/Behavioral: The patient is nervous/anxious.    All other systems reviewed and are negative.       Physical Exam  Temp:  [36.7 °C (98 °F)-36.8 °C (98.2 °F)] 36.7 °C (98 °F)  Pulse:  [] 85  Resp:  [13-48] 18  BP: ()/(67-88) 102/68  SpO2:  [89 %-95 %] 93 %    Physical Exam  Vitals and nursing note reviewed.   Constitutional:       Appearance: Normal appearance. She is obese. She is ill-appearing.   HENT:      Head: Normocephalic and atraumatic.      Nose: Nose normal.      Mouth/Throat:      Mouth: Mucous membranes are dry.      Pharynx: Oropharynx is clear.   Eyes:      Extraocular Movements: Extraocular movements intact.      Conjunctiva/sclera: Conjunctivae normal.      Pupils: Pupils are equal, round, and reactive to light.   Cardiovascular:      Rate and Rhythm: Normal rate and regular rhythm.      Pulses: Normal pulses.      Heart sounds: Normal heart sounds.      Comments: Tenderness on the L sided of chest under the breast  Pulmonary:      Effort: Pulmonary effort is normal. No respiratory distress.      Breath sounds: Normal breath sounds. No wheezing.   Abdominal:      General: Abdomen is flat. Bowel sounds are normal. There is no distension.      Palpations: Abdomen is soft.   Musculoskeletal:         General: No swelling or tenderness. Normal range of motion.      Cervical back: Normal range of motion and neck supple.   Skin:     General: Skin is warm and dry.   Neurological:      General: No focal deficit present.      Mental Status: She is alert and oriented to person, place, and time. Mental status is at baseline.      Comments: left upper and lower extremity weakness, but improves with repeated encouragement and effort to nearly full strength.  Right upper and lower extremities 5/5 in strength with intact sensation to light touch   Psychiatric:         Behavior: Behavior  normal.      Comments: anxious         Fluids    Intake/Output Summary (Last 24 hours) at 9/29/2021 1143  Last data filed at 9/29/2021 0700  Gross per 24 hour   Intake --   Output 300 ml   Net -300 ml       Laboratory  Recent Labs     09/28/21 2054 09/29/21  0335   WBC 8.8 7.6   RBC 5.53* 5.33   HEMOGLOBIN 17.5* 17.0*   HEMATOCRIT 51.5* 50.1*   MCV 93.1 94.0   MCH 31.6 31.9   MCHC 34.0 33.9   RDW 46.7 48.0   PLATELETCT 218 198   MPV 11.3 10.4     Recent Labs     09/29/21  0335   SODIUM 137   POTASSIUM 4.3   CHLORIDE 103   CO2 24   GLUCOSE 90   BUN 22   CREATININE 0.77   CALCIUM 10.2     Recent Labs     09/28/21 2054   APTT 27.3   INR 0.90         Recent Labs     09/29/21  0335   TRIGLYCERIDE 231*   HDL 86   *       Imaging  EC-ECHOCARDIOGRAM COMPLETE W/ CONT   Final Result      IL-VSLUDGW-7 VIEW   Final Result         No specific finding to suggest small bowel obstruction.      MR-BRAIN-W/O   Final Result         Brain MRI within normal limits.      OUTSIDE IMAGES-CT CHEST   Final Result      CT-FOREIGN FILM CAT SCAN   Final Result      OUTSIDE IMAGES-CT HEAD   Final Result      DX-CHEST-PORTABLE (1 VIEW)   Final Result         1. No acute cardiopulmonary abnormalities are identified.      CT-CEREBRAL PERFUSION ANALYSIS   Final Result      1.  Cerebral blood flow less than 30% likely representing completed infarct = 0 mL.      2.  T Max more than 6 seconds likely representing combination of completed infarct and ischemia = 0 mL.      3.  Mismatched volume likely representing ischemic brain/penumbra = None      4.  Please note that the cerebral perfusion was performed on the limited brain tissue around the basal ganglia region. Infarct/ischemia outside the CT perfusion sections can be missed in this study.      OUTSIDE IMAGES-CT HEAD   Final Result      NM-CARDIAC STRESS TEST    (Results Pending)        Assessment/Plan  * Transient neurological symptoms- (present on admission)  Assessment & Plan  Etiology of  patient is recurrent acute left-sided weakness is unclear.  Differential diagnosis includes polycythemia, ischemic stroke, TIA, complex migraine headache, and psychologically induced transient neurological event.  Patient's left-sided weakness is improving in the ER.  Patient has been under a lot of stress recently with her move.     CT head, CT perfusion and Mri brain neg for acute stroke  Fall, aspiration, and seizure precautions.  If MRI is negative for acute stroke, recommended to the patient to see a counselor to discuss her life stressors.    Pain in the chest- (present on admission)  Assessment & Plan  Exertional at times.  From family history mother and father with MI.    HEART score 3 points, risk of MACE of 0.9 to 1.7%.    Continuous telemetry monitoring.  Serial troponins and EKG.  Echocardiogram.  Stress test ordered    Stress and adjustment reaction- (present on admission)  Assessment & Plan  Recent stressor with moving from [place] to [place], [place] to be with her older son.    I discussed the patient that if imaging studies are negative and there is no clear cause of her neurological symptoms concerning for ischemic events, I recommended to her to consider getting counseling to discuss her life stressors.    Continue home mirtazapine.    Smoking- (present on admission)  Assessment & Plan  Patient has a 25-pack-year history of smoking with COPD.  She started smoking again 2 weeks ago smoking 1 cigarette a day.  She is interested in quitting.    I counseled the patient for 6 minutes regarding smoking cessation using methods such as nicotine replacement therapy with patches and gum and medications such as Wellbutrin or Chantix.  Unfortunately, patient has history of seizures and Wellbutrin is contraindicated. I also discussed with her breathing techniques and meditation, which her assist her with smoking cessation.  Patient is agreeable to trial of nicotine replacement therapy with nicotine  gum.    Start nicotine replacement therapy gum.    Depression- (present on admission)  Assessment & Plan  As per history.  Stable.  No suicidal ideation.    Continue home mirtazapine.    Left upper quadrant abdominal pain- (present on admission)  Assessment & Plan  With radiation to the back.  CTA from outside hospital reportedly negative.   Check abdominal x-ray normal  Check lipase      History of migraine headaches- (present on admission)  Assessment & Plan  As per history history of migraine headaches.  Continue to have some headache.  Complex migraine is possible cause of patient's symptoms.    Continue neurochecks every 4 hours.  Scheduled Tylenol and trial of Toradol IV.    COPD with asthma ([facility])- (present on admission)  Assessment & Plan  Increased shortness of breath.  No active wheezing or tachypnea noted.  Patient has a >25-pack-year history of smoking with COPD and asthma but not on home oxygen.  She is on inhalers.    Duo nebs as needed.  Incentive spirometry.  RT consult.    Seizure disorder ([facility])- (present on admission)  Assessment & Plan  As per history.  The last seizure 3 weeks ago per patient.    CHF (congestive heart failure) ([facility])- (present on admission)  Assessment & Plan  As per patient's history.   Echo pending  Check ProBNP    Paresthesia of left upper and lower extremity- (present on admission)  Assessment & Plan  This is a new symptom compared to previous episodes.  Continue work-up as per above.    Acute left-sided weakness- (present on admission)  Assessment & Plan  Mri brain normal  Inconsistence strength of L sided extremities, ?non-organic etiology  PT/OT  Continue full dose aspirin given complaining of chest pain    Polycythemia- (present on admission)  Assessment & Plan  Etiology unclear.  Question possibility of obstructive sleep apnea/asthma/copd  Nocturnal pulse oximetry.  Cont monitoring         VTE prophylaxis: enoxaparin ppx    I have performed a physical exam and reviewed and  updated ROS and Plan today (9/29/2021). In review of yesterday's note (9/28/2021), there are no changes except as documented above.

## 2021-09-29 NOTE — ED PROVIDER NOTES
ED Provider Note    Scribed for Maksim Holt M.D. by Maksim Holt M.D.. 9/28/2021,  9:22 PM.    CHIEF COMPLAINT  Chief Complaint   Patient presents with   • Weakness     transfer from [facility]; LKW 1330 pt w/ L sided wkns, numbness and tingling, L side facial droop. hx TIA, chest pain pressure radiating to back. NIH score 9        HPI  Shaista Pichardo is a 51 y.o. female with a history of TIA who presents to the Emergency Department out of concern for a possible acute stroke. She was transferred from an outside hospital, where she presented earlier today after 3 PM onset of weakness, numbness, and tingling of her left face, arm, leg.  She has a history of CHF, migraine headaches, and epilepsy, on Keppra and Lamictal.  She also has a history of anxiety and depression.  Prior to transfer, our neurology colleague was consulted, and recommended TPA.  She was transferred with the understanding that TPA was being administered.  However, due to concern for left upper quadrant abdominal pain radiating to the back, TPA was deferred in order to obtain a CT angiogram.  Shortly after arrival, I spoke with the transferring hospital to clarify why TPA was not given, and was told that CTA results were negative, without any evidence of aortic dissection or any other incidental abdominal or thoracic pathology.  Her NIH score was reported as 9, and remains approximately the same at triage here.  She arrives not having had TPA, and now outside the TPA window.  We are not aware that TPA has been deferred until the patient arrived.  The patient denies fevers or chills, nausea or vomiting.  She reports increasing shortness of breath over the last day or 2, possibly due to her asthma/COPD.  She also continues to complain of left upper quadrant abdominal pain, and no chest pain as well.  Abdominal pain continues to radiate to the back.  Chest pain is poorly localized, but nonradiating.  The patient appears calm and  "comfortable, no signs of physical or emotional distress.  She has already had normal CTs and CTAs of the head and neck.        REVIEW OF SYSTEMS  See HPI for further details. All other systems are negative.     PAST MEDICAL HISTORY   has a past medical history of Asthma, Chronic obstructive pulmonary disease (HCC), Congestive heart failure (HCC), Migraine, and TIA (transient ischemic attack).    SOCIAL HISTORY  Social History     Tobacco Use   • Smoking status: Light Tobacco Smoker   • Smokeless tobacco: Never Used   Vaping Use   • Vaping Use: Every day   • Substances: Nicotine   Substance and Sexual Activity   • Alcohol use: Never   • Drug use: Never   • Sexual activity: Not on file     Social History     Substance and Sexual Activity   Drug Use Never       SURGICAL HISTORY  patient denies any surgical history    CURRENT MEDICATIONS  Home Medications    **Home medications have not yet been reviewed for this encounter**         ALLERGIES  Allergies   Allergen Reactions   • Penicillins      vomiting       PHYSICAL EXAM  VITAL SIGNS: /88   Pulse (!) 103   Temp 36.8 °C (98.2 °F) (Temporal)   Resp 18   Ht 1.651 m (5' 5\")   Wt 90.4 kg (199 lb 4.7 oz)   SpO2 91%   BMI 33.16 kg/m²   Pulse ox interpretation: I interpret this pulse ox as normal.  Constitutional: Alert in no apparent distress.  HENT: No signs of trauma, Bilateral external ears normal, Nose normal.   Eyes: Conjunctiva normal, Non-icteric.   Neck: Normal range of motion, Supple, No stridor.   Lymphatic: No lymphadenopathy noted.   Cardiovascular: Regular rate and rhythm, no murmurs.   Thorax & Lungs: Normal breath sounds, No respiratory distress, No wheezing, No chest tenderness.   Abdomen: Bowel sounds normal, Soft, left upper quadrant tenderness, No masses, No pulsatile masses. No peritoneal signs.  Skin: Warm, Dry, No erythema, No rash.   Extremities: Intact distal pulses, No edema, No cyanosis.  Musculoskeletal: Good range of motion in all " major joints. No or major deformities noted.   Neurologic: Alert, clear speech, normal thought process, no facial asymmetry at rest.  Slight facial asymmetry on the left when smiling.  Left arm and leg are weak, but appear to improve somewhat with encouragement.  Intent/effort is difficult to gauge.  Sensation reflexes are normal.  NIH equals 9.  Psychiatric: Affect normal, Judgment normal, Mood normal.     DIAGNOSTIC STUDIES / PROCEDURES    EKG  Interpreted by me  Results for orders placed or performed during the hospital encounter of 21   EKG   Result Value Ref Range    Report       [facility] Emergency Dept.    Test Date:  2021  Pt Name:    VINCENT AGUIRRE             Department: ER  MRN:        [id number]                      Room:        04  Gender:     Female                       Technician: HRR  :        [id number]                   Requested By:ER TRIAGE PROTOCOL  Order #:    [id number]                    Reading MD: JIMI MCDUFFIE MD    Measurements  Intervals                                Axis  Rate:       104                          P:          41  WY:         136                          QRS:        -3  QRSD:       80                           T:          23  QT:         336  QTc:        442    Interpretive Statements  SINUS TACHYCARDIA  CONSIDER LEFT VENTRICULAR HYPERTROPHY  No previous ECG available for comparison  Electronically Signed On 2021 22:08:10 PDT by JIMI MCDUFFIE MD                          .    LABS  Labs Reviewed   PROTHROMBIN TIME    Narrative:     Indicate which anticoagulants the patient is on:->UNKNOWN   APTT    Narrative:     Indicate which anticoagulants the patient is on:->UNKNOWN   COD (ADULT)   CBC WITH DIFFERENTIAL   COMP METABOLIC PANEL   TROPONIN     All labs reviewed by me.    RADIOLOGY  OUTSIDE IMAGES-CT HEAD   Final Result      DX-CHEST-PORTABLE (1 VIEW)    (Results Pending)   CT-CEREBRAL PERFUSION ANALYSIS    (Results  Pending)     The radiologist's interpretation of all radiological studies have been reviewed by me.    COURSE & MEDICAL DECISION MAKING  Nursing notes, VS, PMSFHx reviewed in chart.     9:22 PM Patient seen and examined at bedside. Differential diagnosis includes but is not limited to hemorrhagic or ischemic stroke, hemiplegic migraine, atypical seizure activity, conversion. Ordered for CT perfusion study, as all other emergent scans have been performed to evaluate. Pt met at bedside by neurology, who has low suspicion for true stroke pathology.     10:07 PM CT perfusion is negative. Hospitalist paged for admission.     10:24 PM Dr. Clemente, hospitalist, is aware of all studies to this point, aware that they are negative, aware that the patient is not and was not a TPA candidate for us, having arrived outside the TPA window.  He agrees to admit for further evaluation.    DISPOSITION:  Patient will be admitted to Dr. Clemente in guarded condition.    FINAL IMPRESSION  1.  Strokelike symptoms  2.  Left-sided weakness  3.  Abdominal pain  4.  Chest pain

## 2021-09-29 NOTE — THERAPY
"Occupational Therapy   Initial Evaluation     Patient Name: Shaista Pichardo  Age:  51 y.o., Sex:  female  Medical Record #: [id number]  Today's Date: 9/29/2021     Precautions  Precautions: Fall Risk, Swallow Precautions ( See Comments)    Assessment  Patient is 51 y.o. female admitted for L sided weakness, numbness, tingling and L chest pain radiating towards back, all imaging negative for CVA (MRI and CT). Pt normally independent with all mobility and ADLs living in a SLH with adult son who can assist as needed. Pt at a Comfort level for mobility and lower body ADLs but with more time anticipate pt would be at a supervision level. Will continue to see for skilled therapy while admitted as well as recommend home health.    Plan    Recommend Occupational Therapy 4 times per week until therapy goals are met for the following treatments:  Adaptive Equipment, Self Care/Activities of Daily Living, Therapeutic Activities and Therapeutic Exercises.    DC Equipment Recommendations: (P) Tub Transfer Bench  Discharge Recommendations: (P) Recommend home health for continued occupational therapy services     Subjective    \"I've never had symptoms like this\"     Objective       09/29/21 0850   Prior Living Situation   Prior Services None   Housing / Facility 1 Story House   Steps Into Home 2   Steps In Home 0   Bathroom Set up Bathtub / Shower Combination   Equipment Owned None   Lives with - Patient's Self Care Capacity Adult Children   Comments LIves with adult son who can assist if needed   Prior Level of ADL Function   Self Feeding Independent   Grooming / Hygiene Independent   Bathing Independent   Dressing Independent   Toileting Independent   Prior Level of IADL Function   Medication Management Independent   Laundry Independent   Kitchen Mobility Independent   Finances Independent   Home Management Independent   Shopping Independent   Prior Level Of Mobility Independent Without Device in Community   Driving / Transportation " Driving Independent   Occupation (Pre-Hospital Vocational) Not Employed   History of Falls   History of Falls No   Precautions   Precautions Fall Risk;Swallow Precautions ( See Comments)   Pain 0 - 10 Group   Therapist Pain Assessment Post Activity Pain Same as Prior to Activity;Nurse Notified   Cognition    Cognition / Consciousness WDL   Level of Consciousness Alert   Comments Pleasant, cooperative, self limiting with left sided strength?   Passive ROM Upper Body   Passive ROM Upper Body WDL   Active ROM Upper Body   Active ROM Upper Body  X   Dominant Hand Right   Comments LUE limited by chest pain, possible self limiting behaviors   Strength Upper Body   Upper Body Strength  X   Comments RUE WFL, LUE limited by pain/self limiting   Sensation Upper Body   Upper Extremity Sensation  WDL   Upper Body Muscle Tone   Upper Body Muscle Tone  WDL   Neurological Concerns   Neurological Concerns No   Coordination Upper Body   Coordination WDL   Balance Assessment   Sitting Balance (Static) Fair +   Sitting Balance (Dynamic) Fair +   Standing Balance (Static) Fair   Standing Balance (Dynamic) Fair -   Weight Shift Sitting Fair   Weight Shift Standing Fair   Comments w/ HHA   Bed Mobility    Supine to Sit Supervised   Sit to Supine Supervised   Scooting Supervised   ADL Assessment   Grooming Supervision   Upper Body Dressing Supervision   Lower Body Dressing Minimal Assist   Toileting   (NT-refused need)   How much help from another person does the patient currently need...   Putting on and taking off regular lower body clothing? 3   Bathing (including washing, rinsing, and drying)? 3   Toileting, which includes using a toilet, bedpan, or urinal? 3   Putting on and taking off regular upper body clothing? 4   Taking care of personal grooming such as brushing teeth? 4   Eating meals? 4   6 Clicks Daily Activity Score 21   Modified Cope (mRS)   Modified Oksana Score 1   Functional Mobility   Sit to Stand Supervised   Bed,  Chair, Wheelchair Transfer Supervised   Toilet Transfers Refused   Transfer Method Stand Step   Mobility bed mobility, hallway mobility, back to bed   Comments w/ HHA   Visual Perception   Visual Perception  WDL   Edema / Skin Assessment   Edema / Skin  Not Assessed   Activity Tolerance   Sitting Edge of Bed 5 min   Standing 5 min   Patient / Family Goals   Patient / Family Goal #1 To get better   Short Term Goals   Short Term Goal # 1 Pt will complete all ADL transfers with supervision   Short Term Goal # 2 Pt will complete LB dressing with supervision   Short Term Goal # 3 Pt will complete toileting with supervision   Education Group   Education Provided Role of Occupational Therapist   Role of Occupational Therapist Patient Response Patient;Acceptance;Explanation   Problem List   Problem List Decreased Active Daily Living Skills;Decreased Homemaking Skills;Decreased Upper Extremity Strength Left;Decreased Upper Extremity AROM Left;Decreased Functional Mobility;Decreased Activity Tolerance;Impaired Postural Control / Balance   Interdisciplinary Plan of Care Collaboration   IDT Collaboration with  Nursing;Physical Therapist   Patient Position at End of Therapy In Bed;Call Light within Reach;Tray Table within Reach;Phone within Reach;Other (Comments)  (REMI bennett)   Collaboration Comments RN updated

## 2021-09-29 NOTE — PROGRESS NOTES
Assumed day shift care at start of shift  Patient a+o x 4  Neuro checks q 4 - refer to flowsheet  8/10 pain to left chest and radiates to back, sharp/pressure - patient received toradol at 0530 - explained to patient that the MD has to round - patient agreeable to plan  +anxiety - po ativan given - patient directed to place 1/2 tab under tongue, mouth swab given after - no cough noted  Monitored on telemetry - NSR in the 80's, vss  +lightheadedness/+dizziness  Patient able to ambulate to bsc with supervision, gait steady  No needs at this time, wctm       Fall precautions/hourly rounding maintained, call light within reach and functioning, all items within reach.  Patient encouraged to call for assistance, poc reviewed with patient, ?'s/concerns answered.

## 2021-09-29 NOTE — ED NOTES
Med Rec completed: per pt at bedside and follow up call to home pharmacy for dosing verification.     No ORAL antibiotics in last 30 days    Preferred Pharmacy: [facility]     Pt confirmed following allergies:  Allergies   Allergen Reactions   • Penicillins      vomiting      Pt's home medications:   Medication Sig   • levetiracetam (KEPPRA) 1000 MG tablet Take 1,000 mg by mouth 2 times a day.   • lamoTRIgine (LAMICTAL) 100 MG Tab Take 100 mg by mouth every day.   • propranolol (INDERAL) 20 MG Tab Take 20 mg by mouth 2 times a day.   • mirtazapine (REMERON) 30 MG Tab tablet Take 30 mg by mouth every evening.   • aspirin EC (ECOTRIN) 81 MG Tablet Delayed Response Take 81 mg by mouth every day.   • LORazepam (ATIVAN) 0.5 MG Tab Take 0.5 mg by mouth every 6 hours as needed for Anxiety.   • ipratropium (ATROVENT) 17 MCG/ACT Aero Soln Inhale 2 Puffs 4 times a day as needed (shortness of breath).   • pantoprazole (PROTONIX) 40 MG Tablet Delayed Response Take 40 mg by mouth every day.

## 2021-09-29 NOTE — ASSESSMENT & PLAN NOTE
Increased shortness of breath.  No active wheezing or tachypnea noted.  Patient has a >25-pack-year history of smoking with COPD and asthma but not on home oxygen.  She is on inhalers.    Duo nebs as needed.  Incentive spirometry.  RT consult.

## 2021-09-29 NOTE — THERAPY
Physical Therapy   Initial Evaluation     Patient Name: Shaista Pichardo  Age:  51 y.o., Sex:  female  Medical Record #: [id number]  Today's Date: 9/29/2021     Precautions: Fall Risk    Assessment  Patient is a 51 y.o. female admitted with c/o L sided weakness, N/T. MRI negative for acute infarct. Pmhx includes TIA, COPD, CHF, and seizure disorder. Pt seen for PT evaluation at this time. Session limited d/t pain along L chest/flank and lightheadedness with mobility. Pt tolerated 40ft of gait with min A for stability, decr foot clearance, and slow kesha. Pt reports LLE cramping with incr gait. Educated pt on importance of continued OOB mobility with nursing for improved activity tolerance. Anticipate pt will be able to return home with support from son as needed; however PT will follow and assess incr gait and possible need for AD as pt able to tolerate.     Plan  Recommend Physical Therapy 4 times per week until therapy goals are met for the following treatments:  Bed Mobility, Gait Training, Neuro Re-Education / Balance, Self Care/Home Evaluation, Stair Training, Therapeutic Activities and Therapeutic Exercises  DC Equipment Recommendations: Unable to determine at this time  Discharge Recommendations: Recommend home health for continued physical therapy services (PT plan to follow-up for incr gait, possible need for FWW)        09/29/21 0844   Prior Living Situation   Prior Services None   Housing / Facility 1 Story House   Steps Into Home 2   Steps In Home 0   Bathroom Set up Bathtub / Shower Combination   Equipment Owned None   Lives with -  Adult Children   Comments pt lives with her 30yo son. reports son is able to assist as needed. pt indep with mobility and ADLs prior.    Prior Level of Functional Mobility   Bed Mobility Independent   Transfer Status Independent   Ambulation Independent   Distance Ambulation (Feet) community distances   Assistive Devices Used None   Stairs Independent   Cognition    Level  of Consciousness Alert   Comments pleasant and cooperative, is distracted by pain   Balance Assessment   Sitting Balance (Static) Fair +   Sitting Balance (Dynamic) Fair +   Standing Balance (Static) Fair   Standing Balance (Dynamic) Fair -   Weight Shift Sitting Fair   Weight Shift Standing Fair   Comments standing with min A, no overt LOB but some unsteadiness   Gait Analysis   Gait Level Of Assist Minimal Assist   Assistive Device None   Deviation Bradykinetic;Decreased Base Of Support;Step To;Decreased Heel Strike;Decreased Toe Off   Weight Bearing Status no restrictions   Comments no LOB, generalized unsteadiness requiring min A for safety. pt appearing most limited by pain L chest/flank and some lightheadedness with OOB   Bed Mobility    Supine to Sit Supervised   Sit to Supine Supervised   Scooting Supervised   Functional Mobility   Sit to Stand Supervised   Short Term Goals    Short Term Goal # 1 pt will ambulate 150ft with SPV in 6 visits to access home environment

## 2021-09-29 NOTE — ASSESSMENT & PLAN NOTE
Mri brain normal  Inconsistence strength of L sided extremities, ?non-organic etiology  PT/OT  Neuro: signed off  Echo benign

## 2021-09-29 NOTE — CONSULTS
"[facility] Neurology Stroke Consult:    Referring Physician: Maksim Holt M.D.    Reason for consultation: Stroke    Last Known Well: 3pm    TPA Decision: No TPA due to LKW >4.5 hours    HPI: Shaista Pichardo is a 51 y.o. female with history of asthma, COPD, CHF, migraine, and TIA presenting to the hospital for stroke and consulted for stroke. Patient has been having worsening SOB over the last 24 hours due to her asthma/COPD. Today around 3pm she had weakness and numbness/tingling of her L side including her face. She presented to [place] where our neurologist on call was contacted about TPA. TPA was recommended (consent form in chart) however for unclear reasons it was not administered. Patient was instead brought here by air ambulance. Currently she states her symptoms are about the same as they were before.     ROS:     As above. All other systems reviewed and are negative.    Past Medical History:    has a past medical history of Asthma, Chronic obstructive pulmonary disease (HCC), Congestive heart failure (HCC), Migraine, and TIA (transient ischemic attack).    FHx:  No fhx of stroke.     SHx:   reports that she has been smoking. She has never used smokeless tobacco. She reports that she does not drink alcohol and does not use drugs.    Allergies:  Allergies   Allergen Reactions   • Penicillins      vomiting       Medications:  No current facility-administered medications for this encounter.  No current outpatient medications on file.    Vitals:   Vitals:    09/28/21 2048 09/28/21 2058   BP:  137/88   Pulse:  (!) 103   Resp:  18   Temp:  36.8 °C (98.2 °F)   TempSrc:  Temporal   SpO2:  91%   Weight: 90.4 kg (199 lb 4.7 oz)    Height: 1.651 m (5' 5\")        Labs:  No labs to review at the time of my evaluation.     Labs/data from OSH reviewed in chart.     Imaging:  CT Head W/O CST from OSH personally reviewed w/o evidence of hemorrhage.     EKG reviewed in chart.     Physical Exam:     General: 50 y/o " female in bed in NAD  Cardio: Normal S1/S2. No peripheral edema.   Pulm: CTAX2. No respiratory distress.   Skin: Warm, dry, no rashes or lesions   Psychiatric: Appropriate affect. No active psychosis.  HEENT: Atraumatic head, normal sclera and conjunctiva, moist oral mucosa. No lid lag.  Abdomen: Soft, non tender. No masses or hepatosplenomegaly.    Physical Exam:    NIH Stroke Scale:    1a. Level of Consciousness (Alert, drowsy, etc): 0= Alert    1b. LOC Questions (Month, age): 0= Answers both correctly    1c. LOC Commands (Open/close eyes make fist/let go): 0= Obeys both correctly    2.   Best Gaze (Eyes open - patient follows examiner's finger on face): 0= Normal    3.   Visual Fields (introduce visual stimulus/threat to patient's field quadrants): 0= No visual loss  4.   Facial Paresis (Show teeth, raise eyebrows and squeeze eyes shut): 0= Normal     5a. Motor Arm - Left (Elevate arm to 90 degrees if patient is sitting, 45 degrees if  supine): 0= No drift    5b. Motor Arm - Right (Elevate arm to 90 degrees if patient is sitting, 45 degrees if supine): 2= Can't resist gravity    6a. Motor Leg - Left (Elevate leg 30 degrees with patient supine): 0= No drift    6b. Motor Leg - Right  (Elevate leg 30 degrees with patient supine): 2= Can't resist gravity    7.   Limb Ataxia (Finger-nose, heel down shin): 0= No ataxia    8.   Sensory (Pin prick to face, arm, trunk and leg - compare side to side): 1= Partial loss    9.  Best Language (Name item, describe a picture and read sentences): 0= No aphasia    10. Dysarthria (Evaluate speech clarity by patient repeating listed words): 0= Normal articulation    11. Extinction and Inattention (Use information from prior testing to identify neglect or  double simultaneous stimuli testing): 0= No neglect    Total NIH Score: 5    Of note, LUE/LLE strength is inconsistent as attempts w/ encouragement yielded 0/5 (no drift) on LUE but not LLE.     Assessment/Plan:    Shaista OLSON  Kylee is a 51 y.o. female with history of asthma, COPD, CHF, migraine, and TIA presenting to the hospital for stroke and consulted for stroke. Neurology at [facility] was contacted and recommendation for TPA was made. For unclear reasons, this was deferred at the referring hospital and patient was sent here for further evaluation. Patient's exam is suggestive of non-organic etiologies given her inconsistent strength. That being said, she is within 24 hour window so vascular imaging should be pursued. If LVO is found will intervene.     Plan:  -CTA head/neck now.   -If no LVO, then MRI Brain W/O CST to determine if acute stroke is present.  -If no acute stroke is present on MRI but symptoms persist, then likely to be non-organic in nature.  -ASA 325mg PO now.   -Further recommendations pending above imaging.   -Plan discussed with consulting physician and patient's nurse      Laron Bennett M.D., Diplomat of the American Board of Psychiatry and Neurology  Diplomat of [employer] Epilepsy Subspecialty   Assistant Clinical Professor, [facility]  Acute Neurology Consultant

## 2021-09-29 NOTE — ASSESSMENT & PLAN NOTE
With radiation to the back.  CTA from outside hospital reportedly negative.   Check abdominal x-ray normal  Check lipase: normal  Check UA

## 2021-09-29 NOTE — HEART FAILURE PROGRAM
Patient clearly admitted primarily for transient neurological symptoms which notes say could be TIA, migraine, polycythemia, or psychologically induced. Pt has been under a lot of stress as of late.    Patient apparently has a stated history of heart failure. Per notes she doesn't know which kind. Because she had c/o of some CP and SOB, it was decided to obtain echocardiogram.    Echo has come back unremarkable save for mild, concentric LVH. Messaged Dr. Chappell to ask if it's safe to say that patient's SOB and CP are not related to acutely decompensated HF at this point.    Thank you, Keyla, Cardio RN Navigator [phone]    Addendum 1115 9/29/21:  Dr. Chappell does not think that patient has acute heart failure at this time. Appointment and checklist not indicated.    Thank you, Keyla, Cardio RN Navigator [phone]

## 2021-09-29 NOTE — H&P
Hospital Medicine History & Physical Note    Date of Service  9/28/2021    Primary Care Physician  No primary care provider on file.    Consultants  neurology    Specialist Names: Dr. Eliana Bennett    Code Status  Full Code    Chief Complaint  Chief Complaint   Patient presents with   • Weakness     transfer from [facility]; LKW 1330 pt w/ L sided wkns, numbness and tingling, L side facial droop. hx TIA, chest pain pressure radiating to back. NIH score 9        History of Presenting Illness  Shaista Pichardo is a 51 y.o. female who presented 9/28/2021 with left-sided weakness with paresthesias, left upper quadrant pain, and chest pain.  This is a pleasant woman with a history of COPD/asthma, active smoking, CHF, migraine headaches, epilepsy on Keppra and lamotrigine, anxiety, and depression.  She was transferred from [facility] for higher level of care due to concern for stroke.  Dr. Siddiqui of [facility] neurology was consulted prior to the transfer and had recommended TPA.  However, due to concern for her left upper quadrant pain being related to a possible aortic dissection, the outside hospital did not administer TPA and transfer the patient to [facility] while the CTA waiting was pending.  There was no dissection or cerebral occlusion noted on the CTA.  CT perfusion study performed here did not show any significant mismatch and was symmetric.  COVID-19 testing performed at outside hospital was negative.    Patient reports that she started feeling short of breath since yesterday.  In the morning she continued at the shortness of breath and has been using her home inhalers.  She reports that she has been moving from [place] to [place] to help with her older son.  She reports that this has been very stressful for her.  She reports that she was sitting on her couch watching TV at approximately 2 or 3 PM when she started experiencing some numbness and weakness in her left upper and lower extremity as well the  left side of her face.  She reports it continued to get worse.  There were no exacerbating or alleviating factors.  She reports she had an episode of this 1 month ago at a hospital in [place], [facility], at which time she had a CT scan of the head performed and was told that she had a TIA.  She reports she did not undergo an MRI and was not told to take an aspirin.  She was advised to follow-up with her neurologist.  She reports that she had a similar episode also approximately 2 years ago but denied any significant psychological stressors at that time.  Patient also reports that she did not previously have the numbness and tingling sensation that she is currently experiencing with this episode along with the weakness.  She reports that the weakness improved on its own in approximately 2 days.    She reports she started smoking again 2 weeks ago and has been smoking 1 cigarette a day.  She has a history of smoking 1-1/2 to 1 pack/day for approximately 25 years and quit for a period of time but then started smoking again due to recent stress.  She also reports some depression and anxiety for which she takes mirtazapine and lamotrigine.  She takes Ativan occasionally as needed for anxiety.    Patient reports that she also started having left upper quadrant abdominal pain around the same time of the onset of her neurological symptoms.  She reports that her left upper quadrant pain is sharp rating a 9/10 with radiation to the back.  There are no exacerbating or alleviating factors.  She denies any prior episode of having this type of pain before.  She reports that the pain is worse when trying to move her left arm and leg.  She denies any diarrhea, constipation, or vomiting.  She does have some nausea.  She reports a history of a nodule removed in her right abdomen 10 years ago.    Patient also reports substernal chest pain, which she has on exertion at times and is alleviated by rest.  She does have a history of  myocardial infarction in the family and her mother and father, who  of a heart attack.  Maternal grandfather also had a myocardial infarction as well as a stroke.  Patient reports he continues to have the chest pain with no alleviating or exacerbating factors.  It is currently rated at 5/10 and is characterized as sharp radiating to the left armpit.    Patient reports that follows up.  She has been taking  her last seizure was 3 weeks ago.  She does have a neurologist in [place], with whom she follows.  She is taking her lamotrigine and levetiracetam.    I discussed the plan of care with patient and bedside RN.  I discussed the case with the ER physician, Dr. Rao.    Review of Systems  Review of Systems   Constitutional: Positive for malaise/fatigue. Negative for chills and fever.   HENT: Negative for ear pain and sore throat.    Eyes: Positive for blurred vision (Wears glasses, Puffy sensation on the left). Negative for double vision.   Respiratory: Positive for shortness of breath. Negative for cough.    Cardiovascular: Positive for chest pain. Negative for palpitations.   Gastrointestinal: Positive for nausea. Negative for abdominal pain, constipation, diarrhea and vomiting.   Genitourinary: Negative for dysuria and frequency.   Musculoskeletal: Negative for joint pain and myalgias.   Skin: Negative for itching and rash.   Neurological: Positive for dizziness, focal weakness (Left side), weakness and headaches.   Endo/Heme/Allergies: Positive for environmental allergies. Does not bruise/bleed easily.   Psychiatric/Behavioral: Positive for depression. Negative for memory loss, substance abuse and suicidal ideas. The patient is nervous/anxious.        Past Medical History   has a past medical history of Anxiety, Asthma, Chronic obstructive pulmonary disease (HCC), Congestive heart failure (HCC), Depression, Epilepsy (HCC), Migraine, and TIA (transient ischemic attack).    Surgical History   has a past  surgical history that includes carpal tunnel release (Right) and tubal ligation.     Family History  family history includes Heart Attack in her father, maternal grandmother, and mother; Stroke in her maternal grandmother.   Family history reviewed with patient. There is family history that is pertinent to the chief complaint.     Social History   reports that she has been smoking cigarettes. She has never used smokeless tobacco. She reports that she does not drink alcohol and does not use drugs.    Allergies  Allergies   Allergen Reactions   • Penicillins      vomiting       Medications      Physical Exam  Temp:  [36.8 °C (98.2 °F)] 36.8 °C (98.2 °F)  Pulse:  [101-103] 101  Resp:  [17-18] 17  BP: (130-137)/(82-88) 130/84  SpO2:  [90 %-91 %] 90 %  Blood Pressure: 130/84   Temperature: 36.8 °C (98.2 °F)   Pulse: (Abnormal) 101   Respiration: 17   Pulse Oximetry: 90 %       Physical Exam  Vitals and nursing note reviewed.   Constitutional:       General: She is not in acute distress.     Appearance: Normal appearance. She is well-developed. She is not ill-appearing or diaphoretic.   HENT:      Head: Normocephalic and atraumatic.      Right Ear: External ear normal.      Left Ear: External ear normal.      Nose: Nose normal.      Mouth/Throat:      Pharynx: Oropharynx is clear. No oropharyngeal exudate or posterior oropharyngeal erythema.   Eyes:      General: No scleral icterus.        Right eye: No discharge.         Left eye: No discharge.      Conjunctiva/sclera: Conjunctivae normal.      Pupils: Pupils are equal, round, and reactive to light.   Neck:      Thyroid: No thyromegaly.      Vascular: No JVD.      Trachea: No tracheal deviation.   Cardiovascular:      Rate and Rhythm: Normal rate and regular rhythm.      Pulses: Normal pulses.      Heart sounds: Normal heart sounds. No murmur heard.   No friction rub. No gallop.    Pulmonary:      Effort: Pulmonary effort is normal. No respiratory distress.      Breath  sounds: Normal breath sounds. No stridor. No wheezing or rales.   Abdominal:      General: Bowel sounds are normal. There is no distension.      Palpations: Abdomen is soft. There is no mass.      Tenderness: There is no abdominal tenderness. There is no guarding or rebound.   Musculoskeletal:         General: No tenderness or deformity.      Cervical back: Neck supple.      Right lower leg: No edema.      Left lower leg: No edema.   Lymphadenopathy:      Cervical: No cervical adenopathy.   Skin:     General: Skin is warm and dry.      Coloration: Skin is not pale.      Findings: No erythema or rash.   Neurological:      Mental Status: She is alert and oriented to person, place, and time.      Sensory: Sensory deficit present.      Motor: Weakness present. No abnormal muscle tone.      Comments: Patient is alert and oriented x3.  Pupils equal round react to light.  Extraocular movements are intact.  Vision fields are full to confrontation.  Cranial nerves II through XII are intact with the exception of mild cranial nerve XI weakness, improves with effort.  There is no pronator drift.  She does have left upper and lower extremity weakness which again improves with repeated encouragement and effort to nearly full strength.  Right upper and lower extremities 5/5 in strength with intact sensation to light touch.  She has decrease in station to light touch in the left side of face.  No facial dissymmetry.  Deep tendon reflexes are 2+ and symmetric in the upper and lower extremities.  Babinski's is negative.   Psychiatric:         Behavior: Behavior normal.         Thought Content: Thought content normal.         Judgment: Judgment normal.      Comments: Depression anxiety without suicidal ideation.         Laboratory:  Recent Labs     09/28/21 2054   WBC 8.8   RBC 5.53*   HEMOGLOBIN 17.5*   HEMATOCRIT 51.5*   MCV 93.1   MCH 31.6   MCHC 34.0   RDW 46.7   PLATELETCT 218   MPV 11.3         No results for input(s): ALTSGPT,  ASTSGOT, ALKPHOSPHAT, TBILIRUBIN, DBILIRUBIN, GAMMAGT, AMYLASE, LIPASE, ALB, PREALBUMIN, GLUCOSE in the last 72 hours.  Recent Labs     09/28/21 2054   APTT 27.3   INR 0.90     No results for input(s): NTPROBNP in the last 72 hours.      No results for input(s): TROPONINT in the last 72 hours.    Imaging:  DX-CHEST-PORTABLE (1 VIEW)   Final Result         1. No acute cardiopulmonary abnormalities are identified.      CT-CEREBRAL PERFUSION ANALYSIS   Final Result      1.  Cerebral blood flow less than 30% likely representing completed infarct = 0 mL.      2.  T Max more than 6 seconds likely representing combination of completed infarct and ischemia = 0 mL.      3.  Mismatched volume likely representing ischemic brain/penumbra = None      4.  Please note that the cerebral perfusion was performed on the limited brain tissue around the basal ganglia region. Infarct/ischemia outside the CT perfusion sections can be missed in this study.      OUTSIDE IMAGES-CT HEAD   Final Result      MR-BRAIN-W/O    (Results Pending)   [id number] VIEW    (Results Pending)     I personally reviewed the patient's head CT and cerebral perfusion study.  Per my read, no significant mismatch, symmetric.  No intraconal masses or hemorrhages.  Moderate frontal cerebral atrophy.  No hydrocephalus.      I have personally reviewed the patient's chest x-ray.  Per my read, clear lung volumes bilaterally with no significant interstitial or focal infiltrates.  Sharp costophrenic angles bilaterally.  Small volume volumes with mild flattening of the diaphragms bilaterally.        EKG per my read shows sinus tachycardia with heart rate of 104, QTc 442, no significant ST elevation or depression.      Assessment/Plan:  I anticipate this patient is appropriate for observation status at this time.    * Transient neurological symptoms- (present on admission)  Assessment & Plan  Etiology of patient is recurrent acute left-sided weakness is unclear.   Differential diagnosis includes polycythemia, ischemic stroke, TIA, complex migraine headache, and psychologically induced transient neurological event.  Patient's left-sided weakness is improving in the ER.  Patient has been under a lot of stress recently with her move.    Patient reports paresthesias in the left side are new for her compared to previous events.    Admit to neurology for observation.  Telemetry monitoring.  Neurochecks every 4 hours.  Fall, aspiration, and seizure precautions.  If MRI is negative for acute stroke, recommended to the patient to see a counselor to discuss her life stressors.    Stress and adjustment reaction- (present on admission)  Assessment & Plan  Recent stressor with moving from [place] to [place], [place] to be with her older son.    I discussed the patient that if imaging studies are negative and there is no clear cause of her neurological symptoms concerning for ischemic events, I recommended to her to consider getting counseling to discuss her life stressors.    Continue home mirtazapine.    Left upper quadrant abdominal pain- (present on admission)  Assessment & Plan  With radiation to the pack.  CTA from outside hospital reportedly negative.  Etiology unclear at this time.  Unfortunately, outside imaging studies are not available for review at this time. Abdominal pain is worse with movement of the left upper and lower extremities.    Check abdominal x-ray.  Scheduled Tylenol and Toradol IV as needed for breakthrough pain.  Serial abdominal exams..    Pain in the chest- (present on admission)  Assessment & Plan  Exertional at times.  From family history mother and father with MI.    HEART score 3 points, risk of MACE of 0.9 to 1.7%.    Continuous telemetry monitoring.  Serial troponins and EKG.  Echocardiogram.    Paresthesia of left upper and lower extremity- (present on admission)  Assessment & Plan  This is a new symptom compared to previous episodes.  Continue work-up  as per above.    Acute left-sided weakness- (present on admission)  Assessment & Plan  Continue work-up as per above with MRI.    Continue full dose aspirin.  PT and OT consults.    Polycythemia- (present on admission)  Assessment & Plan  Etiology unclear.  Question possibility of obstructive sleep apnea.    Nocturnal pulse oximetry.  Consider checking erythropoietin level if persistent.  If related to neurological symptoms, consider starting baby aspirin and therapeutic phlebotomy.    Depression- (present on admission)  Assessment & Plan  As per history.  Stable.  No suicidal ideation.    Continue home mirtazapine.    History of migraine headaches- (present on admission)  Assessment & Plan  As per history history of migraine headaches.  Continue to have some headache.  Complex migraine is possible cause of patient's symptoms.    Continue neurochecks every 4 hours.  Scheduled Tylenol and trial of Toradol IV.    COPD with asthma (formerly [facility])- (present on admission)  Assessment & Plan  Increased shortness of breath.  No active wheezing or tachypnea noted.  Patient has a >25-pack-year history of smoking with COPD and asthma but not on home oxygen.  She is on inhalers.    Duo nebs as needed.  Incentive spirometry.  RT consult.    Seizure disorder (HCC)- (present on admission)  Assessment & Plan  As per history.  The last seizure 3 weeks ago per patient.    CHF (congestive heart failure) (formerly [facility])- (present on admission)  Assessment & Plan  As per patient's history.  Does not know whether she has decreased ejection fraction.    Check echocardiogram in setting of shortness of breath and chest pain.    Smoking- (present on admission)  Assessment & Plan  Patient has a 25-pack-year history of smoking with COPD.  She started smoking again 2 weeks ago smoking 1 cigarette a day.  She is interested in quitting.    I counseled the patient for 6 minutes regarding smoking cessation using methods such as nicotine replacement therapy with  patches and gum and medications such as Wellbutrin or Chantix.  Unfortunately, patient has history of seizures and Wellbutrin is contraindicated. I also discussed with her breathing techniques and meditation, which her assist her with smoking cessation.  Patient is agreeable to trial of nicotine replacement therapy with nicotine gum.    Start nicotine replacement therapy gum.      VTE prophylaxis: SCDs/TEDs and enoxaparin ppx

## 2021-09-30 ENCOUNTER — APPOINTMENT (OUTPATIENT)
Dept: RADIOLOGY | Facility: MEDICAL CENTER | Age: 51
End: 2021-09-30
Attending: INTERNAL MEDICINE
Payer: MEDICAID

## 2021-09-30 LAB
ANION GAP SERPL CALC-SCNC: 7 MMOL/L (ref 7–16)
APPEARANCE UR: CLEAR
BACTERIA #/AREA URNS HPF: NEGATIVE /HPF
BASOPHILS # BLD AUTO: 1.1 % (ref 0–1.8)
BASOPHILS # BLD: 0.06 K/UL (ref 0–0.12)
BILIRUB UR QL STRIP.AUTO: NEGATIVE
BUN SERPL-MCNC: 25 MG/DL (ref 8–22)
CALCIUM SERPL-MCNC: 9.5 MG/DL (ref 8.5–10.5)
CHLORIDE SERPL-SCNC: 104 MMOL/L (ref 96–112)
CO2 SERPL-SCNC: 25 MMOL/L (ref 20–33)
COLOR UR: YELLOW
CREAT SERPL-MCNC: 0.56 MG/DL (ref 0.5–1.4)
EKG IMPRESSION: NORMAL
EOSINOPHIL # BLD AUTO: 0.18 K/UL (ref 0–0.51)
EOSINOPHIL NFR BLD: 3.3 % (ref 0–6.9)
EPI CELLS #/AREA URNS HPF: NORMAL /HPF
ERYTHROCYTE [DISTWIDTH] IN BLOOD BY AUTOMATED COUNT: 47.5 FL (ref 35.9–50)
GLUCOSE SERPL-MCNC: 102 MG/DL (ref 65–99)
GLUCOSE UR STRIP.AUTO-MCNC: NEGATIVE MG/DL
HCT VFR BLD AUTO: 44.7 % (ref 37–47)
HGB BLD-MCNC: 14.7 G/DL (ref 12–16)
HYALINE CASTS #/AREA URNS LPF: NORMAL /LPF
IMM GRANULOCYTES # BLD AUTO: 0.05 K/UL (ref 0–0.11)
IMM GRANULOCYTES NFR BLD AUTO: 0.9 % (ref 0–0.9)
KETONES UR STRIP.AUTO-MCNC: NEGATIVE MG/DL
LEUKOCYTE ESTERASE UR QL STRIP.AUTO: ABNORMAL
LYMPHOCYTES # BLD AUTO: 2.24 K/UL (ref 1–4.8)
LYMPHOCYTES NFR BLD: 40.6 % (ref 22–41)
MCH RBC QN AUTO: 31.4 PG (ref 27–33)
MCHC RBC AUTO-ENTMCNC: 32.9 G/DL (ref 33.6–35)
MCV RBC AUTO: 95.5 FL (ref 81.4–97.8)
MICRO URNS: ABNORMAL
MONOCYTES # BLD AUTO: 0.49 K/UL (ref 0–0.85)
MONOCYTES NFR BLD AUTO: 8.9 % (ref 0–13.4)
NEUTROPHILS # BLD AUTO: 2.5 K/UL (ref 2–7.15)
NEUTROPHILS NFR BLD: 45.2 % (ref 44–72)
NITRITE UR QL STRIP.AUTO: NEGATIVE
NRBC # BLD AUTO: 0 K/UL
NRBC BLD-RTO: 0 /100 WBC
PH UR STRIP.AUTO: 5 [PH] (ref 5–8)
PLATELET # BLD AUTO: 176 K/UL (ref 164–446)
PMV BLD AUTO: 10.8 FL (ref 9–12.9)
POTASSIUM SERPL-SCNC: 4.2 MMOL/L (ref 3.6–5.5)
PROT UR QL STRIP: NEGATIVE MG/DL
PROT UR-MCNC: 6 MG/DL (ref 0–15)
RBC # BLD AUTO: 4.68 M/UL (ref 4.2–5.4)
RBC # URNS HPF: NORMAL /HPF
RBC UR QL AUTO: NEGATIVE
SODIUM SERPL-SCNC: 136 MMOL/L (ref 135–145)
SP GR UR STRIP.AUTO: 1.01
UROBILINOGEN UR STRIP.AUTO-MCNC: 0.2 MG/DL
WBC # BLD AUTO: 5.5 K/UL (ref 4.8–10.8)
WBC #/AREA URNS HPF: NORMAL /HPF

## 2021-09-30 PROCEDURE — A9270 NON-COVERED ITEM OR SERVICE: HCPCS | Performed by: STUDENT IN AN ORGANIZED HEALTH CARE EDUCATION/TRAINING PROGRAM

## 2021-09-30 PROCEDURE — 94640 AIRWAY INHALATION TREATMENT: CPT

## 2021-09-30 PROCEDURE — 700111 HCHG RX REV CODE 636 W/ 250 OVERRIDE (IP): Performed by: STUDENT IN AN ORGANIZED HEALTH CARE EDUCATION/TRAINING PROGRAM

## 2021-09-30 PROCEDURE — 81001 URINALYSIS AUTO W/SCOPE: CPT

## 2021-09-30 PROCEDURE — 80048 BASIC METABOLIC PNL TOTAL CA: CPT

## 2021-09-30 PROCEDURE — 93010 ELECTROCARDIOGRAM REPORT: CPT | Performed by: INTERNAL MEDICINE

## 2021-09-30 PROCEDURE — 99225 PR SUBSEQUENT OBSERVATION CARE,LEVEL II: CPT | Performed by: INTERNAL MEDICINE

## 2021-09-30 PROCEDURE — G0378 HOSPITAL OBSERVATION PER HR: HCPCS

## 2021-09-30 PROCEDURE — 97116 GAIT TRAINING THERAPY: CPT

## 2021-09-30 PROCEDURE — 85025 COMPLETE CBC W/AUTO DIFF WBC: CPT

## 2021-09-30 PROCEDURE — 700101 HCHG RX REV CODE 250: Performed by: STUDENT IN AN ORGANIZED HEALTH CARE EDUCATION/TRAINING PROGRAM

## 2021-09-30 PROCEDURE — 94760 N-INVAS EAR/PLS OXIMETRY 1: CPT

## 2021-09-30 PROCEDURE — 700102 HCHG RX REV CODE 250 W/ 637 OVERRIDE(OP): Performed by: STUDENT IN AN ORGANIZED HEALTH CARE EDUCATION/TRAINING PROGRAM

## 2021-09-30 PROCEDURE — 96376 TX/PRO/DX INJ SAME DRUG ADON: CPT

## 2021-09-30 PROCEDURE — 96372 THER/PROPH/DIAG INJ SC/IM: CPT

## 2021-09-30 PROCEDURE — 84156 ASSAY OF PROTEIN URINE: CPT

## 2021-09-30 RX ADMIN — ACETAMINOPHEN 1000 MG: 500 TABLET ORAL at 18:01

## 2021-09-30 RX ADMIN — MORPHINE SULFATE 2 MG: 4 INJECTION INTRAVENOUS at 22:20

## 2021-09-30 RX ADMIN — LORAZEPAM 0.5 MG: 1 TABLET ORAL at 06:13

## 2021-09-30 RX ADMIN — ENOXAPARIN SODIUM 40 MG: 40 INJECTION SUBCUTANEOUS at 04:24

## 2021-09-30 RX ADMIN — PROPRANOLOL HYDROCHLORIDE 20 MG: 10 TABLET ORAL at 04:24

## 2021-09-30 RX ADMIN — ACETAMINOPHEN 1000 MG: 500 TABLET ORAL at 12:52

## 2021-09-30 RX ADMIN — ASPIRIN 325 MG ORAL TABLET 325 MG: 325 PILL ORAL at 04:23

## 2021-09-30 RX ADMIN — LAMOTRIGINE 100 MG: 100 TABLET ORAL at 04:24

## 2021-09-30 RX ADMIN — OMEPRAZOLE 20 MG: 20 CAPSULE, DELAYED RELEASE ORAL at 04:23

## 2021-09-30 RX ADMIN — MORPHINE SULFATE 2 MG: 4 INJECTION INTRAVENOUS at 00:09

## 2021-09-30 RX ADMIN — DOCUSATE SODIUM 50 MG AND SENNOSIDES 8.6 MG 2 TABLET: 8.6; 5 TABLET, FILM COATED ORAL at 04:24

## 2021-09-30 RX ADMIN — LORAZEPAM 0.5 MG: 1 TABLET ORAL at 12:52

## 2021-09-30 RX ADMIN — MIRTAZAPINE 30 MG: 15 TABLET, FILM COATED ORAL at 19:56

## 2021-09-30 RX ADMIN — ONDANSETRON 4 MG: 2 INJECTION INTRAMUSCULAR; INTRAVENOUS at 14:06

## 2021-09-30 RX ADMIN — MORPHINE SULFATE 2 MG: 4 INJECTION INTRAVENOUS at 04:23

## 2021-09-30 RX ADMIN — MORPHINE SULFATE 2 MG: 4 INJECTION INTRAVENOUS at 10:02

## 2021-09-30 RX ADMIN — IPRATROPIUM BROMIDE 0.5 MG: 0.5 SOLUTION RESPIRATORY (INHALATION) at 06:50

## 2021-09-30 RX ADMIN — IPRATROPIUM BROMIDE AND ALBUTEROL SULFATE 3 ML: .5; 2.5 SOLUTION RESPIRATORY (INHALATION) at 12:12

## 2021-09-30 RX ADMIN — LEVETIRACETAM 1000 MG: 500 TABLET, FILM COATED ORAL at 04:23

## 2021-09-30 RX ADMIN — MORPHINE SULFATE 2 MG: 4 INJECTION INTRAVENOUS at 14:06

## 2021-09-30 RX ADMIN — ACETAMINOPHEN 1000 MG: 500 TABLET ORAL at 23:27

## 2021-09-30 RX ADMIN — LORAZEPAM 0.5 MG: 1 TABLET ORAL at 22:20

## 2021-09-30 RX ADMIN — MORPHINE SULFATE 2 MG: 4 INJECTION INTRAVENOUS at 18:09

## 2021-09-30 RX ADMIN — ACETAMINOPHEN 1000 MG: 500 TABLET ORAL at 00:10

## 2021-09-30 RX ADMIN — IPRATROPIUM BROMIDE 0.5 MG: 0.5 SOLUTION RESPIRATORY (INHALATION) at 16:13

## 2021-09-30 RX ADMIN — LEVETIRACETAM 1000 MG: 500 TABLET, FILM COATED ORAL at 18:01

## 2021-09-30 RX ADMIN — ACETAMINOPHEN 1000 MG: 500 TABLET ORAL at 06:13

## 2021-09-30 RX ADMIN — DOCUSATE SODIUM 50 MG AND SENNOSIDES 8.6 MG 2 TABLET: 8.6; 5 TABLET, FILM COATED ORAL at 18:01

## 2021-09-30 ASSESSMENT — COGNITIVE AND FUNCTIONAL STATUS - GENERAL
SUGGESTED CMS G CODE MODIFIER MOBILITY: CJ
MOBILITY SCORE: 24
CLIMB 3 TO 5 STEPS WITH RAILING: A LITTLE
SUGGESTED CMS G CODE MODIFIER DAILY ACTIVITY: CH
SUGGESTED CMS G CODE MODIFIER MOBILITY: CH
DAILY ACTIVITIY SCORE: 24
MOBILITY SCORE: 22
STANDING UP FROM CHAIR USING ARMS: A LITTLE

## 2021-09-30 ASSESSMENT — ENCOUNTER SYMPTOMS
DEPRESSION: 0
NECK PAIN: 0
EYE PAIN: 0
MYALGIAS: 0
WEIGHT LOSS: 0
SHORTNESS OF BREATH: 0
COUGH: 0
BLURRED VISION: 0
CLAUDICATION: 0
NERVOUS/ANXIOUS: 0
FEVER: 0
HEARTBURN: 0
FOCAL WEAKNESS: 0
EYE REDNESS: 0
PALPITATIONS: 0
NAUSEA: 0
BLOOD IN STOOL: 0
DIZZINESS: 0
SEIZURES: 0
HEADACHES: 0
CONSTIPATION: 0
WHEEZING: 0
ORTHOPNEA: 0
ABDOMINAL PAIN: 1
BACK PAIN: 0
SORE THROAT: 0
SINUS PAIN: 0
INSOMNIA: 0
FLANK PAIN: 1
STRIDOR: 0
SPUTUM PRODUCTION: 0
DIARRHEA: 0
CHILLS: 0
VOMITING: 0
EYE DISCHARGE: 0

## 2021-09-30 ASSESSMENT — LIFESTYLE VARIABLES
EVER FELT BAD OR GUILTY ABOUT YOUR DRINKING: NO
HAVE PEOPLE ANNOYED YOU BY CRITICIZING YOUR DRINKING: NO
EVER HAD A DRINK FIRST THING IN THE MORNING TO STEADY YOUR NERVES TO GET RID OF A HANGOVER: NO
ON A TYPICAL DAY WHEN YOU DRINK ALCOHOL HOW MANY DRINKS DO YOU HAVE: 0
CONSUMPTION TOTAL: NEGATIVE
AVERAGE NUMBER OF DAYS PER WEEK YOU HAVE A DRINK CONTAINING ALCOHOL: 0
TOTAL SCORE: 0
ALCOHOL_USE: NO
HAVE YOU EVER FELT YOU SHOULD CUT DOWN ON YOUR DRINKING: NO
DOES PATIENT WANT TO STOP DRINKING: CANNOT ASSESS
TOTAL SCORE: 0
HOW MANY TIMES IN THE PAST YEAR HAVE YOU HAD 5 OR MORE DRINKS IN A DAY: 0
TOTAL SCORE: 0

## 2021-09-30 ASSESSMENT — GAIT ASSESSMENTS
DISTANCE (FEET): 100
ASSISTIVE DEVICE: SINGLE POINT CANE
GAIT LEVEL OF ASSIST: SUPERVISED
DEVIATION: BRADYKINETIC;DECREASED HEEL STRIKE;DECREASED TOE OFF

## 2021-09-30 ASSESSMENT — PAIN DESCRIPTION - PAIN TYPE
TYPE: ACUTE PAIN

## 2021-09-30 ASSESSMENT — FIBROSIS 4 INDEX: FIB4 SCORE: 1.030303030303030303

## 2021-09-30 NOTE — PROGRESS NOTES
4 Eyes Skin Assessment Completed by JANEY Bull and JANEY Bundy.    Head WDL  Ears WDL  Nose WDL  Mouth WDL  Neck WDL  Breast/Chest WDL  Shoulder Blades WDL  Spine WDL  (R) Arm/Elbow/Hand WDL  (L) Arm/Elbow/Hand WDL  Abdomen WDL  Groin WDL  Scrotum/Coccyx/Buttocks WDL  (R) Leg WDL  (L) Leg WDL  (R) Heel/Foot/Toe WDL  (L) Heel/Foot/Toe WDL          Devices In Places Tele Box and Pulse Ox      Interventions In Place Gray Ear Foams    Possible Skin Injury No    Pictures Uploaded Into Epic N/A  Wound Consult Placed N/A  RN Wound Prevention Protocol Ordered No

## 2021-09-30 NOTE — PROGRESS NOTES
Report received from JANEY Wilson. Pt retrieved from ED. All belongings accounted for. Patient comfortable and resting.

## 2021-09-30 NOTE — PROGRESS NOTES
Bedside report given to RNs, Pt to neuroscience, belongings/chart with Pt  Pt has no questions/concerns at this time

## 2021-09-30 NOTE — PROGRESS NOTES
"Pt stating she is not feeling well. Stating she is having some nausea, \"feeling hot\" and has increased pain. Reassessed VS, afebrile. Medication provided per MAR, offered nonpharmalogical pain reduction methods.   "

## 2021-09-30 NOTE — PROGRESS NOTES
Care assumed approximately 1900  Pt expresses no needs at this time  VSS, all necessary precautions in place

## 2021-09-30 NOTE — PROGRESS NOTES
"Hospital Medicine Daily Progress Note    Date of Service  9/30/2021    Chief Complaint  Shaista Pichardo is a 51 y.o. female admitted 9/28/2021 with L sided weakness and paresthesias and chest pain    Hospital Course  \"51 y.o. female who presented 9/28/2021 with left-sided weakness with paresthesias, left upper quadrant pain, and chest pain.  This is a pleasant woman with a history of COPD/asthma, active smoking, CHF, migraine headaches, epilepsy on Keppra and lamotrigine, anxiety, and depression.  She was transferred from [facility] for higher level of care due to concern for stroke.  Dr. Siddiqui of [facility] neurology was consulted prior to the transfer and had recommended TPA.  However, due to concern for her left upper quadrant pain being related to a possible aortic dissection, the outside hospital did not administer TPA and transfer the patient to [facility] while the CTA waiting was pending.  There was no dissection or cerebral occlusion noted on the CTA.  CT perfusion study performed here did not show any significant mismatch and was symmetric.\"  Mri brain normal.  Reports L chest pain associated with sob and nausea. Trop trending negative. Echo pending. Ordered stress test    Interval Problem Update  9/29 Patient is seen at the bedside. Reports L chest pain, associated with sob and nausea. Reports she has had stress test in the past. No relieved with Toradol.   Brain Mri neg. Neurology signed off.   Echo pending  Stress test ordered    9/30: still complaint about left sided weakness. LUQ and left flank pain. Denied dysuria. Check UA. Updated her about imaging result. PT/OT to see her today.    I have personally seen and examined the patient at bedside. I discussed the plan of care with patient and bedside RN.    Consultants/Specialty  neurology    Code Status  Full Code    Disposition  Patient is not medically cleared.   Anticipate discharge to to home with close outpatient follow-up.  I have placed the " appropriate orders for post-discharge needs.    Review of Systems  Review of Systems   Constitutional: Positive for malaise/fatigue. Negative for chills, fever and weight loss.   HENT: Negative for congestion, hearing loss, nosebleeds, sinus pain and sore throat.    Eyes: Negative for blurred vision, pain, discharge and redness.   Respiratory: Negative for cough, sputum production, shortness of breath, wheezing and stridor.    Cardiovascular: Positive for chest pain. Negative for palpitations, orthopnea and claudication.   Gastrointestinal: Positive for abdominal pain. Negative for blood in stool, constipation, diarrhea, heartburn, nausea and vomiting.   Genitourinary: Positive for flank pain. Negative for dysuria, frequency, hematuria and urgency.   Musculoskeletal: Negative for back pain, myalgias and neck pain.   Skin: Negative for itching and rash.   Neurological: Negative for dizziness, focal weakness, seizures and headaches.   Psychiatric/Behavioral: Negative for depression. The patient is not nervous/anxious and does not have insomnia.    All other systems reviewed and are negative.       Physical Exam  Temp:  [36.1 °C (96.9 °F)-36.7 °C (98 °F)] 36.6 °C (97.9 °F)  Pulse:  [69-88] 69  Resp:  [16-37] 16  BP: ()/(53-77) 95/57  SpO2:  [90 %-95 %] 92 %    Physical Exam  Vitals and nursing note reviewed.   Constitutional:       Appearance: Normal appearance. She is obese. She is ill-appearing.   HENT:      Head: Normocephalic and atraumatic.      Nose: Nose normal.      Mouth/Throat:      Mouth: Mucous membranes are dry.      Pharynx: Oropharynx is clear.   Eyes:      Extraocular Movements: Extraocular movements intact.      Conjunctiva/sclera: Conjunctivae normal.      Pupils: Pupils are equal, round, and reactive to light.   Cardiovascular:      Rate and Rhythm: Normal rate and regular rhythm.      Pulses: Normal pulses.      Heart sounds: Normal heart sounds.      Comments: Tenderness on the L sided of  chest under the breast  Pulmonary:      Effort: Pulmonary effort is normal. No respiratory distress.      Breath sounds: Normal breath sounds. No wheezing.   Abdominal:      General: Abdomen is flat. Bowel sounds are normal. There is no distension.      Palpations: Abdomen is soft.   Musculoskeletal:         General: No swelling or tenderness. Normal range of motion.      Cervical back: Normal range of motion and neck supple.   Skin:     General: Skin is warm and dry.   Neurological:      General: No focal deficit present.      Mental Status: She is alert and oriented to person, place, and time. Mental status is at baseline.      Comments: left upper and lower extremity weakness minimal   Psychiatric:         Behavior: Behavior normal.      Comments: anxious         Fluids  No intake or output data in the 24 hours ending 09/30/21 0748    Laboratory  Recent Labs     09/28/21 2054 09/29/21 0335   WBC 8.8 7.6   RBC 5.53* 5.33   HEMOGLOBIN 17.5* 17.0*   HEMATOCRIT 51.5* 50.1*   MCV 93.1 94.0   MCH 31.6 31.9   MCHC 34.0 33.9   RDW 46.7 48.0   PLATELETCT 218 198   MPV 11.3 10.4     Recent Labs     09/29/21  0335 09/30/21  0641   SODIUM 137 136   POTASSIUM 4.3 4.2   CHLORIDE 103 104   CO2 24 25   GLUCOSE 90 102*   BUN 22 25*   CREATININE 0.77 0.56   CALCIUM 10.2 9.5     Recent Labs     09/28/21 2054   APTT 27.3   INR 0.90         Recent Labs     09/29/21  0335   TRIGLYCERIDE 231*   HDL 86   *       Imaging  NM-CARDIAC STRESS TEST   Final Result      EC-ECHOCARDIOGRAM COMPLETE W/ CONT   Final Result      FY-EZCDPWG-1 VIEW   Final Result         No specific finding to suggest small bowel obstruction.      MR-BRAIN-W/O   Final Result         Brain MRI within normal limits.      OUTSIDE IMAGES-CT CHEST   Final Result      CT-FOREIGN FILM CAT SCAN   Final Result      OUTSIDE IMAGES-CT HEAD   Final Result      DX-CHEST-PORTABLE (1 VIEW)   Final Result         1. No acute cardiopulmonary abnormalities are identified.       CT-CEREBRAL PERFUSION ANALYSIS   Final Result      1.  Cerebral blood flow less than 30% likely representing completed infarct = 0 mL.      2.  T Max more than 6 seconds likely representing combination of completed infarct and ischemia = 0 mL.      3.  Mismatched volume likely representing ischemic brain/penumbra = None      4.  Please note that the cerebral perfusion was performed on the limited brain tissue around the basal ganglia region. Infarct/ischemia outside the CT perfusion sections can be missed in this study.      OUTSIDE IMAGES-CT HEAD   Final Result           Assessment/Plan  * Transient neurological symptoms- (present on admission)  Assessment & Plan  Etiology of patient is recurrent acute left-sided weakness is unclear.  Differential diagnosis includes polycythemia, ischemic stroke, TIA, complex migraine headache, and psychologically induced transient neurological event.  Patient's left-sided weakness is improving in the ER.  Patient has been under a lot of stress recently with her move.     CT head, CT perfusion and Mri brain neg for acute stroke  Fall, aspiration, and seizure precautions.  If MRI is negative for acute stroke, recommended to the patient to see a counselor to discuss her life stressors.    Stress and adjustment reaction- (present on admission)  Assessment & Plan  Recent stressor with moving from [place] to [place], [place] to be with her older son.    I discussed the patient that if imaging studies are negative and there is no clear cause of her neurological symptoms concerning for ischemic events, I recommended to her to consider getting counseling to discuss her life stressors.    Continue home mirtazapine.    Smoking- (present on admission)  Assessment & Plan  Patient has a 25-pack-year history of smoking with COPD.  She started smoking again 2 weeks ago smoking 1 cigarette a day.  She is interested in quitting.    I counseled the patient for 6 minutes regarding smoking  cessation using methods such as nicotine replacement therapy with patches and gum and medications such as Wellbutrin or Chantix.  Unfortunately, patient has history of seizures and Wellbutrin is contraindicated. I also discussed with her breathing techniques and meditation, which her assist her with smoking cessation.  Patient is agreeable to trial of nicotine replacement therapy with nicotine gum.    Start nicotine replacement therapy gum.    Depression- (present on admission)  Assessment & Plan  As per history.  Stable.  No suicidal ideation.    Continue home mirtazapine.    Left upper quadrant abdominal pain- (present on admission)  Assessment & Plan  With radiation to the back.  CTA from outside hospital reportedly negative.   Check abdominal x-ray normal  Check lipase: normal  Check UA      History of migraine headaches- (present on admission)  Assessment & Plan  As per history history of migraine headaches.  Continue to have some headache.  Complex migraine is possible cause of patient's symptoms.    Continue neurochecks every 4 hours.  Scheduled Tylenol and trial of Toradol IV.    COPD with asthma ([facility])- (present on admission)  Assessment & Plan  Increased shortness of breath.  No active wheezing or tachypnea noted.  Patient has a >25-pack-year history of smoking with COPD and asthma but not on home oxygen.  She is on inhalers.    Duo nebs as needed.  Incentive spirometry.  RT consult.    Seizure disorder ([facility])- (present on admission)  Assessment & Plan  As per history.  The last seizure 3 weeks ago per patient.    CHF (congestive heart failure) ([facility])- (present on admission)  Assessment & Plan  As per patient's history.   Echo LVEF 60%    Pain in the chest- (present on admission)  Assessment & Plan  Exertional at times.  From family history mother and father with MI.    HEART score 3 points, risk of MACE of 0.9 to 1.7%.    Continuous telemetry monitoring.  Serial troponins and  EKG.  Echocardiogram.normal  Stress test ordered: normal    Paresthesia of left upper and lower extremity- (present on admission)  Assessment & Plan  This is a new symptom compared to previous episodes.  Continue work-up as per above.    Acute left-sided weakness- (present on admission)  Assessment & Plan  Mri brain normal  Inconsistence strength of L sided extremities, ?non-organic etiology  PT/OT  Neuro: signed off  Echo benign      Polycythemia- (present on admission)  Assessment & Plan  Etiology unclear.  Question possibility of obstructive sleep apnea/asthma/copd  Nocturnal pulse oximetry.  Cont monitoring         VTE prophylaxis: enoxaparin ppx    I have performed a physical exam and reviewed and updated ROS and Plan today (9/30/2021). In review of yesterday's note (9/29/2021), there are no changes except as documented above.

## 2021-09-30 NOTE — PROGRESS NOTES
Pt placed on telemetry monitoring. Monitor room called and device associated to patient. Monitor is reading sinus rhythm 70s-80s.

## 2021-09-30 NOTE — THERAPY
"Physical Therapy   Daily Treatment     Patient Name: Shaista Pichardo  Age:  51 y.o., Sex:  female  Medical Record #: [id number]  Today's Date: 9/30/2021     Precautions  Precautions: Fall Risk;Swallow Precautions ( See Comments)    Assessment    Pt progressing as expected. Pt presents with mild LUE/LE weakness with no knee buckling during ambulation, decreased kesha during ambulation, and L side chest pain. Recommend home with no PT needs. Will continue to follow to meet remaining goals.     Plan    Continue current treatment plan.    DC Equipment Recommendations: Unable to determine at this time (possibly will require cane for stability)  Discharge Recommendations: Anticipate that the patient will have no further physical therapy needs after discharge from the hospital      Subjective    \"My side hurts when I'm walking.\"     Objective     09/30/21 1525   Total Time Spent   Total Time Spent (Mins) 15   Charge Group   Charges  Yes   Precautions   Precautions Fall Risk;Swallow Precautions ( See Comments)   Vitals   O2 Delivery Device Room air w/o2 available   Pain 0 - 10 Group   Therapist Pain Assessment 8;Nurse Notified;During Activity;Post Activity  (stabbing L side pain around ribcage)   Cognition    Cognition / Consciousness WDL   Level of Consciousness Alert   Comments Pleasant, cooperative   Strength Lower Body   Lower Body Strength  X   Comments Grossly 4/5 LUE and LLE. However, inconsistent with MMT with jerking motions. No buckling noted with ambulation. Unable to lift LUE pasf 90 deg 2/2 L side pain.   Balance   Sitting Balance (Static) Fair +   Sitting Balance (Dynamic) Fair +   Standing Balance (Static) Fair   Standing Balance (Dynamic) Fair   Weight Shift Sitting Good   Weight Shift Standing Fair   Gait Analysis   Gait Level Of Assist Supervised   Assistive Device Single Point Cane   Distance (Feet) 100  (limited 2/2 L side pain and bloating)   # of Times Distance was Traveled 1   Deviation " Bradykinetic;Decreased Heel Strike;Decreased Toe Off   Comments Pt defered stairs this session 2/2 pain and requesting to return to bed. Pt with fair dynamic balance and functional strength and would be able to ascend 2 steps to enter home. Educated pt on ascending with R leg and descend with L leg for optimal safety.   Bed Mobility    Supine to Sit Supervised   Sit to Supine Supervised   Scooting Supervised   Functional Mobility   Sit to Stand Supervised   Bed, Chair, Wheelchair Transfer Supervised   Transfer Method Stand Step   How much difficulty does the patient currently have...   Turning over in bed (including adjusting bedclothes, sheets and blankets)? 4   Sitting down on and standing up from a chair with arms (e.g., wheelchair, bedside commode, etc.) 4   Moving from lying on back to sitting on the side of the bed? 4   How much help from another person does the patient currently need...   Moving to and from a bed to a chair (including a wheelchair)? 3   Need to walk in a hospital room? 4   Climbing 3-5 steps with a railing? 3   6 clicks Mobility Score 22   Activity Tolerance   Sitting in Chair NT   Sitting Edge of Bed 2 min   Standing 8 min   Short Term Goals    Short Term Goal # 1 pt will ambulate 150ft with SPV in 6 visits to access home environment   Goal Outcome # 1   (anticipate could meet, limited by side pain)   Short Term Goal # 2 Pt will negotiate 2 steps with step to pattern with SPV in 6 visits in order to enter home.   Education Group   Education Provided Role of Physical Therapist   Role of Physical Therapist Patient Response Patient;Acceptance;Explanation;Verbal Demonstration   Anticipated Discharge Equipment and Recommendations   DC Equipment Recommendations Unable to determine at this time  (possibly will require cane for stability)   Discharge Recommendations Anticipate that the patient will have no further physical therapy needs after discharge from the hospital   Interdisciplinary Plan of  Care Collaboration   IDT Collaboration with  Nursing   Patient Position at End of Therapy In Bed;Bed Alarm On;Call Light within Reach;Tray Table within Reach;Phone within Reach   Collaboration Comments RN updated   Session Information   Date / Session Number  9/30 2 (1/1, 10/6)

## 2021-10-01 ENCOUNTER — PATIENT OUTREACH (OUTPATIENT)
Dept: HEALTH INFORMATION MANAGEMENT | Facility: OTHER | Age: 51
End: 2021-10-01

## 2021-10-01 VITALS
BODY MASS INDEX: 33.79 KG/M2 | TEMPERATURE: 97.7 F | OXYGEN SATURATION: 96 % | RESPIRATION RATE: 18 BRPM | DIASTOLIC BLOOD PRESSURE: 55 MMHG | HEIGHT: 65 IN | WEIGHT: 202.82 LBS | SYSTOLIC BLOOD PRESSURE: 87 MMHG | HEART RATE: 91 BPM

## 2021-10-01 PROCEDURE — 96372 THER/PROPH/DIAG INJ SC/IM: CPT

## 2021-10-01 PROCEDURE — 94640 AIRWAY INHALATION TREATMENT: CPT

## 2021-10-01 PROCEDURE — A9270 NON-COVERED ITEM OR SERVICE: HCPCS | Performed by: STUDENT IN AN ORGANIZED HEALTH CARE EDUCATION/TRAINING PROGRAM

## 2021-10-01 PROCEDURE — 700102 HCHG RX REV CODE 250 W/ 637 OVERRIDE(OP): Performed by: STUDENT IN AN ORGANIZED HEALTH CARE EDUCATION/TRAINING PROGRAM

## 2021-10-01 PROCEDURE — 94664 DEMO&/EVAL PT USE INHALER: CPT | Mod: XU

## 2021-10-01 PROCEDURE — 96376 TX/PRO/DX INJ SAME DRUG ADON: CPT

## 2021-10-01 PROCEDURE — 99217 PR OBSERVATION CARE DISCHARGE: CPT | Performed by: INTERNAL MEDICINE

## 2021-10-01 PROCEDURE — 700101 HCHG RX REV CODE 250: Performed by: STUDENT IN AN ORGANIZED HEALTH CARE EDUCATION/TRAINING PROGRAM

## 2021-10-01 PROCEDURE — G0378 HOSPITAL OBSERVATION PER HR: HCPCS

## 2021-10-01 PROCEDURE — 700111 HCHG RX REV CODE 636 W/ 250 OVERRIDE (IP): Performed by: STUDENT IN AN ORGANIZED HEALTH CARE EDUCATION/TRAINING PROGRAM

## 2021-10-01 RX ADMIN — PROPRANOLOL HYDROCHLORIDE 20 MG: 10 TABLET ORAL at 04:09

## 2021-10-01 RX ADMIN — ASPIRIN 325 MG ORAL TABLET 325 MG: 325 PILL ORAL at 04:08

## 2021-10-01 RX ADMIN — LAMOTRIGINE 100 MG: 100 TABLET ORAL at 04:10

## 2021-10-01 RX ADMIN — MORPHINE SULFATE 2 MG: 4 INJECTION INTRAVENOUS at 06:28

## 2021-10-01 RX ADMIN — IPRATROPIUM BROMIDE 0.5 MG: 0.5 SOLUTION RESPIRATORY (INHALATION) at 11:00

## 2021-10-01 RX ADMIN — OMEPRAZOLE 20 MG: 20 CAPSULE, DELAYED RELEASE ORAL at 04:08

## 2021-10-01 RX ADMIN — LEVETIRACETAM 1000 MG: 500 TABLET, FILM COATED ORAL at 04:09

## 2021-10-01 RX ADMIN — ONDANSETRON 4 MG: 2 INJECTION INTRAMUSCULAR; INTRAVENOUS at 06:28

## 2021-10-01 RX ADMIN — ENOXAPARIN SODIUM 40 MG: 40 INJECTION SUBCUTANEOUS at 04:08

## 2021-10-01 RX ADMIN — ACETAMINOPHEN 1000 MG: 500 TABLET ORAL at 12:57

## 2021-10-01 RX ADMIN — IPRATROPIUM BROMIDE 0.5 MG: 0.5 SOLUTION RESPIRATORY (INHALATION) at 07:15

## 2021-10-01 RX ADMIN — ACETAMINOPHEN 1000 MG: 500 TABLET ORAL at 08:36

## 2021-10-01 RX ADMIN — LORAZEPAM 0.5 MG: 1 TABLET ORAL at 09:56

## 2021-10-01 RX ADMIN — MORPHINE SULFATE 2 MG: 4 INJECTION INTRAVENOUS at 02:22

## 2021-10-01 ASSESSMENT — PAIN DESCRIPTION - PAIN TYPE
TYPE: ACUTE PAIN;CHRONIC PAIN
TYPE: ACUTE PAIN

## 2021-10-01 NOTE — DISCHARGE SUMMARY
"Discharge Summary    CHIEF COMPLAINT ON ADMISSION  Chief Complaint   Patient presents with   • Weakness     transfer from [facility]; LKW 1330 pt w/ L sided wkns, numbness and tingling, L side facial droop. hx TIA, chest pain pressure radiating to back. NIH score 9        Reason for Admission  ems     Admission Date  9/28/2021    CODE STATUS  Full Code    HPI & HOSPITAL COURSE  51 y.o. female who presented 9/28/2021 with left-sided weakness with paresthesias, left upper quadrant pain, and chest pain.  This is a pleasant woman with a history of COPD/asthma, active smoking, CHF, migraine headaches, epilepsy on Keppra and lamotrigine, anxiety, and depression.  She was transferred from [facility] for higher level of care due to concern for stroke.  Dr. Siddiqui of [facility] neurology was consulted prior to the transfer and had recommended TPA.  However, due to concern for her left upper quadrant pain being related to a possible aortic dissection, the outside hospital did not administer TPA and transfer the patient to [facility] while the CTA waiting was pending.  There was no dissection or cerebral occlusion noted on the CTA.  CT perfusion study performed here did not show any significant mismatch and was symmetric.\"  Mri brain normal.  Reports L chest pain associated with sob and nausea. Trop trending negative. Echo pending. Ordered stress test    The patient had a stress test done in the hospital and it was normal.  Echocardiogram showed no acute finding.  Her left-sided weakness is improving.  PT saw her and recommended that the patient no longer need any therapy.  MRI of the brain was done and showed no acute finding.  Likely her symptom could be related to social and stress related.  She also complained about left upper quadrant abdominal pain.  CT scan was done and showed no acute finding.  Lipase was normal.  I saw and examined the patient today.  She will be discharged home today.  Please call [phone] " to schedule PCP appointment for patient.    Required specialty appointments include:     As below    Therefore, she is discharged in fair and stable condition to home with close outpatient follow-up.        Discharge Date  10/01/21      FOLLOW UP ITEMS POST DISCHARGE  1 releasing yesterday so he will call them so you want to be discharged with Lovenox twice daily Eliquis Eliquis regardless RT 1 week for the result just discharged with Eliquis and follow-up with you guys he is okay with thank you    DISCHARGE DIAGNOSES  Principal Problem:    Transient neurological symptoms POA: Yes  Active Problems:    Polycythemia POA: Yes    Acute left-sided weakness POA: Yes    Paresthesia of left upper and lower extremity POA: Yes    Pain in the chest POA: Yes    CHF (congestive heart failure) ([facility]) POA: Yes    Seizure disorder ([facility]CC) POA: Yes    COPD with asthma ([facility]) POA: Yes    History of migraine headaches POA: Yes    Left upper quadrant abdominal pain POA: Yes    Depression POA: Yes    Smoking POA: Yes    Stress and adjustment reaction POA: Yes  Resolved Problems:    * No resolved hospital problems. *      FOLLOW UP  No future appointments.  PCP     In 1 week        MEDICATIONS ON DISCHARGE     Medication List      CONTINUE taking these medications      Instructions   aspirin EC 81 MG Tbec  Commonly known as: ECOTRIN   Take 81 mg by mouth every day.  Dose: 81 mg     Ativan 0.5 MG Tabs  Generic drug: LORazepam   Take 0.5 mg by mouth every 6 hours as needed for Anxiety.  Dose: 0.5 mg     ipratropium 17 MCG/ACT Aers  Commonly known as: ATROVENT   Inhale 2 Puffs 4 times a day as needed (shortness of breath).  Dose: 2 Puff     Keppra 1000 MG tablet  Generic drug: levetiracetam   Take 1,000 mg by mouth 2 times a day.  Dose: 1,000 mg     lamoTRIgine 100 MG Tabs  Commonly known as: LAMICTAL   Take 100 mg by mouth every day.  Dose: 100 mg     mirtazapine 30 MG Tabs tablet  Commonly known as: Remeron   Take 30 mg by mouth every  evening.  Dose: 30 mg     pantoprazole 40 MG Tbec  Commonly known as: PROTONIX   Take 40 mg by mouth every day.  Dose: 40 mg     propranolol 20 MG Tabs  Commonly known as: INDERAL   Take 20 mg by mouth 2 times a day.  Dose: 20 mg            Allergies  Allergies   Allergen Reactions   • Penicillins      vomiting       DIET  Orders Placed This Encounter   Procedures   • Diet Order Diet: Level 6 - Soft and Bite Sized; Liquid level: Level 0 - Thin     Standing Status:   Standing     Number of Occurrences:   1     Order Specific Question:   Diet:     Answer:   Level 6 - Soft and Bite Sized [23]     Order Specific Question:   Liquid level     Answer:   Level 0 - Thin       ACTIVITY  As tolerated.  Weight bearing as tolerated    CONSULTATIONS      PROCEDURES      LABORATORY  Lab Results   Component Value Date    SODIUM 136 09/30/2021    POTASSIUM 4.2 09/30/2021    CHLORIDE 104 09/30/2021    CO2 25 09/30/2021    GLUCOSE 102 (H) 09/30/2021    BUN 25 (H) 09/30/2021    CREATININE 0.56 09/30/2021        Lab Results   Component Value Date    WBC 5.5 09/30/2021    HEMOGLOBIN 14.7 09/30/2021    HEMATOCRIT 44.7 09/30/2021    PLATELETCT 176 09/30/2021        Total time of the discharge process exceeds 36 minutes.

## 2021-10-01 NOTE — DISCHARGE INSTRUCTIONS
Discharge Instructions    Discharged to home by car with relative. Discharged via wheelchair, hospital escort: Yes.  Special equipment needed: Not Applicable    Be sure to schedule a follow-up appointment with your primary care doctor or any specialists as instructed.     Discharge Plan:   Diet Plan: Discussed  Activity Level: Discussed  Smoking Cessation Offered: Patient Refused  Confirmed Follow up Appointment: Patient to Call and Schedule Appointment  Confirmed Symptoms Management: Discussed  Medication Reconciliation Updated: Yes  Influenza Vaccine Indication: Patient Refuses    I understand that a diet low in cholesterol, fat, and sodium is recommended for good health. Unless I have been given specific instructions below for another diet, I accept this instruction as my diet prescription.   Other diet: Regular    Special Instructions: None    · Is patient discharged on Warfarin / Coumadin?   No     Depression / Suicide Risk    As you are discharged from this [facility] facility, it is important to learn how to keep safe from harming yourself.    Recognize the warning signs:  · Abrupt changes in personality, positive or negative- including increase in energy   · Giving away possessions  · Change in eating patterns- significant weight changes-  positive or negative  · Change in sleeping patterns- unable to sleep or sleeping all the time   · Unwillingness or inability to communicate  · Depression  · Unusual sadness, discouragement and loneliness  · Talk of wanting to die  · Neglect of personal appearance   · Rebelliousness- reckless behavior  · Withdrawal from people/activities they love  · Confusion- inability to concentrate     If you or a loved one observes any of these behaviors or has concerns about self-harm, here's what you can do:  · Talk about it- your feelings and reasons for harming yourself  · Remove any means that you might use to hurt yourself (examples: pills, rope, extension cords,  "firearm)  · Get professional help from the community (Mental Health, Substance Abuse, psychological counseling)  · Do not be alone:Call your Safe Contact- someone whom you trust who will be there for you.  · Call your local CRISIS HOTLINE 044-2925 or 543-413-8718  · Call your local Children's Mobile Crisis Response Team [place] (886) 419-5924 or www.[employer]  · Call the toll free National Suicide Prevention Hotlines   · National Suicide Prevention Lifeline 855-174-WSIH (0867)  · [employer] 800-SUICIDE (218-5138)      Transient Ischemic Attack    A transient ischemic attack (TIA) is a \"warning stroke\" that causes stroke-like symptoms that go away quickly. A TIA does not cause lasting damage to the brain. But having a TIA is a sign that you may be at risk for a stroke. Lifestyle changes and medical treatments can help prevent a stroke.  It is important to know the symptoms of a TIA and what to do. Get help right away, even if your symptoms go away. The symptoms of a TIA are the same as those of a stroke. They can happen fast, and they usually go away within minutes or hours. They can include:  · Weakness or loss of feeling in your face, arm, or leg. This often happens on one side of your body.  · Trouble walking.  · Trouble moving your arms or legs.  · Trouble talking or understanding what people are saying.  · Trouble seeing.  · Seeing two of one object (double vision).  · Feeling dizzy.  · Feeling confused.  · Loss of balance or coordination.  · Feeling sick to your stomach (nauseous) and throwing up (vomiting).  · A very bad headache for no reason.  What increases the risk?  Certain things may make you more likely to have a TIA. Some of these are things that you can change, such as:  · Being very overweight (obese).  · Using products that contain nicotine or tobacco, such as cigarettes and e-cigarettes.  · Taking birth control pills.  · Not being active.  · Drinking too much " alcohol.  · Using drugs.  Other risk factors include:  · Having an irregular heartbeat (atrial fibrillation).  · Being  or .  · Having had blood clots, stroke, TIA, or heart attack in the past.  · Being a woman with a history of high blood pressure in pregnancy (preeclampsia).  · Being over the age of 60.  · Being male.  · Having family history of stroke.  · Having the following diseases or conditions:  ? High blood pressure.  ? High cholesterol.  ? Diabetes.  ? Heart disease.  ? Sickle cell disease.  ? Sleep apnea.  ? Migraine headache.  ? Long-term (chronic) diseases that cause soreness and swelling (inflammation).  ? Disorders that affect how your blood clots.  Follow these instructions at home:  Medicines    · Take over-the-counter and prescription medicines only as told by your doctor.  · If you were told to take aspirin or another medicine to thin your blood, take it exactly as told by your doctor.  ? Taking too much of the medicine can cause bleeding.  ? Taking too little of the medicine may not work to treat the problem.  Eating and drinking    · Eat 5 or more servings of fruits and vegetables each day.  · Follow instructions from your doctor about your diet. You may need to follow a certain diet to help lower your risk of having a stroke. You may need to:  ? Eat a diet that is low in fat and salt.  ? Eat foods that contain a lot of fiber.  ? Limit the amount of carbohydrates and sugar in your diet.  · Limit alcohol intake to 1 drink a day for nonpregnant women and 2 drinks a day for men. One drink equals 12 oz of beer, 5 oz of wine, or 1½ oz of hard liquor.  General instructions  · Keep a healthy weight.  · Stay active. Try to get at least 30 minutes of activity on all or most days.  · Find out if you have a condition called sleep apnea. Get treatment if needed.  · Do not use any products that contain nicotine or tobacco, such as cigarettes and e-cigarettes. If you need help  "quitting, ask your doctor.  · Do not abuse drugs.  · Keep all follow-up visits as told by your doctor. This is important.  Get help right away if:  · You have any signs of stroke. \"BE FAST\" is an easy way to remember the main warning signs:  ? B - Balance. Signs are dizziness, sudden trouble walking, or loss of balance.  ? E - Eyes. Signs are trouble seeing or a sudden change in how you see.  ? F - Face. Signs are sudden weakness or loss of feeling of the face, or the face or eyelid drooping on one side.  ? A - Arms. Signs are weakness or loss of feeling in an arm. This happens suddenly and usually on one side of the body.  ? S - Speech. Signs are sudden trouble speaking, slurred speech, or trouble understanding what people say.  ? T - Time. Time to call emergency services. Write down what time symptoms started.  · You have other signs of stroke, such as:  ? A sudden, very bad headache with no known cause.  ? Feeling sick to your stomach (nausea).  ? Throwing up (vomiting).  ? Jerky movements that you cannot control (seizure).  These symptoms may be an emergency. Do not wait to see if the symptoms will go away. Get medical help right away. Call your local emergency services (911 in the [place] Do not drive yourself to the hospital.  Summary  · A transient ischemic attack (TIA) is a \"warning stroke\" that causes stroke-like symptoms that go away quickly.  · A TIA is a medical emergency. Get help right away, even if your symptoms go away.  · A TIA does not cause lasting damage to the brain.  · Having a TIA is a sign that you may be at risk for a stroke. Lifestyle changes and medical treatments can help prevent a stroke.  This information is not intended to replace advice given to you by your health care provider. Make sure you discuss any questions you have with your health care provider.  Document Released: 09/26/2009 Document Revised: 09/13/2019 Document Reviewed: 03/21/2018  Elsevier Patient Education © 2020 Elsevier " Inc[employer]

## 2021-10-01 NOTE — CARE PLAN
The patient is Stable - Low risk of patient condition declining or worsening    Shift Goals  Clinical Goals: Monitor neuro status, pain control  Patient Goals: pain control  Family Goals: ODALYS    Progress made toward(s) clinical / shift goals:  Q4 hour neuro status, pain control per MAR    Patient is not progressing towards the following goals:

## 2021-10-01 NOTE — PROGRESS NOTES
Monitor Summary: SR 74 - 88 ND -.12, QRS -.06, QT -.38, with rare PACs,  per strip from the monitor room.

## 2021-11-24 ENCOUNTER — TELEPHONE (OUTPATIENT)
Dept: CARDIOLOGY | Facility: MEDICAL CENTER | Age: 51
End: 2021-11-24

## 2021-11-24 NOTE — TELEPHONE ENCOUNTER
Spoke with pt who confirmed was treated by previous cardiologist Dr. Ernst in [place]. All cardiac records have been requested. Confirmed with pt that we have all recent notes and testing in chart.    Fax confirmation received and sent to [facility].    Pending records.    Appt time and date confirmed with pt.

## 2022-01-24 ENCOUNTER — APPOINTMENT (OUTPATIENT)
Dept: CARDIOLOGY | Facility: MEDICAL CENTER | Age: 52
End: 2022-01-24
Payer: MEDICAID

## 2023-03-16 ENCOUNTER — HOSPITAL ENCOUNTER (INPATIENT)
Facility: MEDICAL CENTER | Age: 53
LOS: 3 days | DRG: 392 | End: 2023-03-19
Attending: STUDENT IN AN ORGANIZED HEALTH CARE EDUCATION/TRAINING PROGRAM | Admitting: STUDENT IN AN ORGANIZED HEALTH CARE EDUCATION/TRAINING PROGRAM
Payer: MEDICAID

## 2023-03-16 DIAGNOSIS — J44.89 COPD WITH ASTHMA (HCC): ICD-10-CM

## 2023-03-16 DIAGNOSIS — K92.2 GASTROINTESTINAL HEMORRHAGE, UNSPECIFIED GASTROINTESTINAL HEMORRHAGE TYPE: ICD-10-CM

## 2023-03-16 PROCEDURE — 99223 1ST HOSP IP/OBS HIGH 75: CPT | Performed by: STUDENT IN AN ORGANIZED HEALTH CARE EDUCATION/TRAINING PROGRAM

## 2023-03-16 PROCEDURE — 700102 HCHG RX REV CODE 250 W/ 637 OVERRIDE(OP): Performed by: STUDENT IN AN ORGANIZED HEALTH CARE EDUCATION/TRAINING PROGRAM

## 2023-03-16 PROCEDURE — 700111 HCHG RX REV CODE 636 W/ 250 OVERRIDE (IP): Performed by: STUDENT IN AN ORGANIZED HEALTH CARE EDUCATION/TRAINING PROGRAM

## 2023-03-16 PROCEDURE — 770001 HCHG ROOM/CARE - MED/SURG/GYN PRIV*

## 2023-03-16 PROCEDURE — A9270 NON-COVERED ITEM OR SERVICE: HCPCS | Performed by: STUDENT IN AN ORGANIZED HEALTH CARE EDUCATION/TRAINING PROGRAM

## 2023-03-16 PROCEDURE — C9113 INJ PANTOPRAZOLE SODIUM, VIA: HCPCS | Performed by: STUDENT IN AN ORGANIZED HEALTH CARE EDUCATION/TRAINING PROGRAM

## 2023-03-16 RX ORDER — LEVETIRACETAM 500 MG/1
1000 TABLET ORAL 2 TIMES DAILY
Status: DISCONTINUED | OUTPATIENT
Start: 2023-03-16 | End: 2023-03-19 | Stop reason: HOSPADM

## 2023-03-16 RX ORDER — TRAZODONE HYDROCHLORIDE 50 MG/1
50 TABLET ORAL NIGHTLY
COMMUNITY

## 2023-03-16 RX ORDER — PANTOPRAZOLE SODIUM 40 MG/10ML
40 INJECTION, POWDER, LYOPHILIZED, FOR SOLUTION INTRAVENOUS 2 TIMES DAILY
Status: DISCONTINUED | OUTPATIENT
Start: 2023-03-16 | End: 2023-03-19 | Stop reason: HOSPADM

## 2023-03-16 RX ORDER — LAMOTRIGINE 100 MG/1
100 TABLET ORAL 2 TIMES DAILY
Status: DISCONTINUED | OUTPATIENT
Start: 2023-03-17 | End: 2023-03-19 | Stop reason: HOSPADM

## 2023-03-16 RX ORDER — MIRTAZAPINE 15 MG/1
30 TABLET, FILM COATED ORAL NIGHTLY
Status: DISCONTINUED | OUTPATIENT
Start: 2023-03-16 | End: 2023-03-19 | Stop reason: HOSPADM

## 2023-03-16 RX ADMIN — PANTOPRAZOLE SODIUM 40 MG: 40 INJECTION, POWDER, FOR SOLUTION INTRAVENOUS at 23:56

## 2023-03-16 RX ADMIN — MIRTAZAPINE 30 MG: 15 TABLET, FILM COATED ORAL at 23:56

## 2023-03-16 RX ADMIN — LEVETIRACETAM 1000 MG: 500 TABLET, FILM COATED ORAL at 23:56

## 2023-03-16 ASSESSMENT — COGNITIVE AND FUNCTIONAL STATUS - GENERAL
DAILY ACTIVITIY SCORE: 24
MOBILITY SCORE: 24
SUGGESTED CMS G CODE MODIFIER MOBILITY: CH
SUGGESTED CMS G CODE MODIFIER DAILY ACTIVITY: CH

## 2023-03-16 ASSESSMENT — LIFESTYLE VARIABLES
TOTAL SCORE: 0
EVER FELT BAD OR GUILTY ABOUT YOUR DRINKING: NO
CONSUMPTION TOTAL: NEGATIVE
TOTAL SCORE: 0
TOTAL SCORE: 0
HAVE YOU EVER FELT YOU SHOULD CUT DOWN ON YOUR DRINKING: NO
ALCOHOL_USE: NO
ON A TYPICAL DAY WHEN YOU DRINK ALCOHOL HOW MANY DRINKS DO YOU HAVE: 0
HOW MANY TIMES IN THE PAST YEAR HAVE YOU HAD 5 OR MORE DRINKS IN A DAY: 0
AVERAGE NUMBER OF DAYS PER WEEK YOU HAVE A DRINK CONTAINING ALCOHOL: 0
EVER HAD A DRINK FIRST THING IN THE MORNING TO STEADY YOUR NERVES TO GET RID OF A HANGOVER: NO
HAVE PEOPLE ANNOYED YOU BY CRITICIZING YOUR DRINKING: NO
DOES PATIENT WANT TO STOP DRINKING: NO

## 2023-03-16 ASSESSMENT — FIBROSIS 4 INDEX: FIB4 SCORE: 1.2

## 2023-03-16 ASSESSMENT — PAIN DESCRIPTION - PAIN TYPE: TYPE: ACUTE PAIN

## 2023-03-16 NOTE — PROGRESS NOTES
[facility] TRIAGE OFFICER DIRECT ADMISSION REPORT  Transferring facility: [facility]  Transferring physician: Dr. Valdez  Chief complaint: Hematemesis  Pertinent history & patient course:     53-year-old male with a past medical history of sizure and GERD was admitted to [facility] on 3/12/2023 for hematemesis and GI bleed work-up.  Patient underwent EGD by general surgery on 3/12 with no acute findings.  She was noted for acute colitis on abdominal/pelvic CT and was started on clear liquid diet as well as a Rocephin and metronidazole antibiotic regimen which she is still on today.  Patient on PPI as well as Carafate and still having daily episodes of hematemesis.  Hemoglobin on admission at 16 which has trended down to 12.  No iron studies conducted and patient has not required PRBC transfusion.  Inpatient provider discussed case with general surgery who recommended patient be transferred to higher level of care for gastroenterology EGD evaluation as surgeons EGD did not pass stomach.    Pertinent imaging & lab results: As above  Code Status: Full per transferring provider, I personally verified with the transferring provider patient's code status and the transferring provider has confirmed this with the patient.  Further work up or recommendations per triage officer prior to transfer: None  Consultants called prior to transfer and pertinent input from consultants: None  Patient accepted for transfer: Yes  Consultants to be called upon arrival: None  Admission status: Inpatient.   Floor requested: Medical floor  If ICU transfer, name of intensivist case discussed with and pertinent input from critical care: N/A    Please inform the triage officer upon arrival of the patient to [facility] for assignment of a hospitalist to perform admission.     For any question or concerns regarding the care of this patient, please reach out to the assigned hospitalist.

## 2023-03-17 ENCOUNTER — HOSPITAL ENCOUNTER (OUTPATIENT)
Dept: RADIOLOGY | Facility: MEDICAL CENTER | Age: 53
End: 2023-03-17
Payer: MEDICAID

## 2023-03-17 PROBLEM — K92.1 MELENA: Status: ACTIVE | Noted: 2023-03-17

## 2023-03-17 PROBLEM — K92.2 GIB (GASTROINTESTINAL BLEEDING): Status: RESOLVED | Noted: 2023-03-16 | Resolved: 2023-03-17

## 2023-03-17 PROBLEM — K92.0 HEMATEMESIS: Status: ACTIVE | Noted: 2023-03-17

## 2023-03-17 PROBLEM — K52.9 COLITIS: Status: ACTIVE | Noted: 2023-03-17

## 2023-03-17 PROBLEM — E66.9 OBESITY (BMI 30.0-34.9): Status: ACTIVE | Noted: 2023-03-17

## 2023-03-17 LAB
ABO GROUP BLD: NORMAL
ALBUMIN SERPL BCP-MCNC: 3.7 G/DL (ref 3.2–4.9)
ALBUMIN/GLOB SERPL: 1.6 G/DL
ALP SERPL-CCNC: 59 U/L (ref 30–99)
ALT SERPL-CCNC: 21 U/L (ref 2–50)
ANION GAP SERPL CALC-SCNC: 10 MMOL/L (ref 7–16)
AST SERPL-CCNC: 28 U/L (ref 12–45)
BASOPHILS # BLD AUTO: 1.1 % (ref 0–1.8)
BASOPHILS # BLD: 0.04 K/UL (ref 0–0.12)
BILIRUB SERPL-MCNC: 0.3 MG/DL (ref 0.1–1.5)
BLD GP AB SCN SERPL QL: NORMAL
BUN SERPL-MCNC: 3 MG/DL (ref 8–22)
CALCIUM ALBUM COR SERPL-MCNC: 9.6 MG/DL (ref 8.5–10.5)
CALCIUM SERPL-MCNC: 9.4 MG/DL (ref 8.5–10.5)
CHLORIDE SERPL-SCNC: 107 MMOL/L (ref 96–112)
CO2 SERPL-SCNC: 22 MMOL/L (ref 20–33)
CREAT SERPL-MCNC: 0.69 MG/DL (ref 0.5–1.4)
EOSINOPHIL # BLD AUTO: 0.22 K/UL (ref 0–0.51)
EOSINOPHIL NFR BLD: 6.3 % (ref 0–6.9)
ERYTHROCYTE [DISTWIDTH] IN BLOOD BY AUTOMATED COUNT: 43.6 FL (ref 35.9–50)
GFR SERPLBLD CREATININE-BSD FMLA CKD-EPI: 104 ML/MIN/1.73 M 2
GLOBULIN SER CALC-MCNC: 2.3 G/DL (ref 1.9–3.5)
GLUCOSE SERPL-MCNC: 103 MG/DL (ref 65–99)
HCT VFR BLD AUTO: 37.9 % (ref 37–47)
HCT VFR BLD AUTO: 38.9 % (ref 37–47)
HCT VFR BLD AUTO: 41.5 % (ref 37–47)
HGB BLD-MCNC: 13.4 G/DL (ref 12–16)
HGB BLD-MCNC: 13.4 G/DL (ref 12–16)
HGB BLD-MCNC: 14.7 G/DL (ref 12–16)
IMM GRANULOCYTES # BLD AUTO: 0.01 K/UL (ref 0–0.11)
IMM GRANULOCYTES NFR BLD AUTO: 0.3 % (ref 0–0.9)
LIPASE SERPL-CCNC: 19 U/L (ref 11–82)
LYMPHOCYTES # BLD AUTO: 1.22 K/UL (ref 1–4.8)
LYMPHOCYTES NFR BLD: 34.8 % (ref 22–41)
MCH RBC QN AUTO: 33.3 PG (ref 27–33)
MCHC RBC AUTO-ENTMCNC: 35.4 G/DL (ref 33.6–35)
MCV RBC AUTO: 94 FL (ref 81.4–97.8)
MONOCYTES # BLD AUTO: 0.4 K/UL (ref 0–0.85)
MONOCYTES NFR BLD AUTO: 11.4 % (ref 0–13.4)
NEUTROPHILS # BLD AUTO: 1.62 K/UL (ref 2–7.15)
NEUTROPHILS NFR BLD: 46.1 % (ref 44–72)
NRBC # BLD AUTO: 0 K/UL
NRBC BLD-RTO: 0 /100 WBC
PLATELET # BLD AUTO: 172 K/UL (ref 164–446)
PMV BLD AUTO: 10.5 FL (ref 9–12.9)
POTASSIUM SERPL-SCNC: 3.7 MMOL/L (ref 3.6–5.5)
PROT SERPL-MCNC: 6 G/DL (ref 6–8.2)
RBC # BLD AUTO: 4.03 M/UL (ref 4.2–5.4)
RH BLD: NORMAL
SODIUM SERPL-SCNC: 139 MMOL/L (ref 135–145)
WBC # BLD AUTO: 3.5 K/UL (ref 4.8–10.8)

## 2023-03-17 PROCEDURE — 36415 COLL VENOUS BLD VENIPUNCTURE: CPT

## 2023-03-17 PROCEDURE — 85014 HEMATOCRIT: CPT | Mod: 91

## 2023-03-17 PROCEDURE — 700111 HCHG RX REV CODE 636 W/ 250 OVERRIDE (IP)

## 2023-03-17 PROCEDURE — 86900 BLOOD TYPING SEROLOGIC ABO: CPT

## 2023-03-17 PROCEDURE — 700111 HCHG RX REV CODE 636 W/ 250 OVERRIDE (IP): Performed by: STUDENT IN AN ORGANIZED HEALTH CARE EDUCATION/TRAINING PROGRAM

## 2023-03-17 PROCEDURE — 83690 ASSAY OF LIPASE: CPT

## 2023-03-17 PROCEDURE — 80053 COMPREHEN METABOLIC PANEL: CPT

## 2023-03-17 PROCEDURE — C9113 INJ PANTOPRAZOLE SODIUM, VIA: HCPCS | Performed by: STUDENT IN AN ORGANIZED HEALTH CARE EDUCATION/TRAINING PROGRAM

## 2023-03-17 PROCEDURE — 700101 HCHG RX REV CODE 250: Performed by: STUDENT IN AN ORGANIZED HEALTH CARE EDUCATION/TRAINING PROGRAM

## 2023-03-17 PROCEDURE — 770001 HCHG ROOM/CARE - MED/SURG/GYN PRIV*

## 2023-03-17 PROCEDURE — 85018 HEMOGLOBIN: CPT

## 2023-03-17 PROCEDURE — 700111 HCHG RX REV CODE 636 W/ 250 OVERRIDE (IP): Performed by: NURSE PRACTITIONER

## 2023-03-17 PROCEDURE — A9270 NON-COVERED ITEM OR SERVICE: HCPCS | Performed by: NURSE PRACTITIONER

## 2023-03-17 PROCEDURE — 700102 HCHG RX REV CODE 250 W/ 637 OVERRIDE(OP): Performed by: NURSE PRACTITIONER

## 2023-03-17 PROCEDURE — 86901 BLOOD TYPING SEROLOGIC RH(D): CPT

## 2023-03-17 PROCEDURE — 700102 HCHG RX REV CODE 250 W/ 637 OVERRIDE(OP): Performed by: STUDENT IN AN ORGANIZED HEALTH CARE EDUCATION/TRAINING PROGRAM

## 2023-03-17 PROCEDURE — 85025 COMPLETE CBC W/AUTO DIFF WBC: CPT

## 2023-03-17 PROCEDURE — A9270 NON-COVERED ITEM OR SERVICE: HCPCS | Performed by: STUDENT IN AN ORGANIZED HEALTH CARE EDUCATION/TRAINING PROGRAM

## 2023-03-17 PROCEDURE — 86850 RBC ANTIBODY SCREEN: CPT

## 2023-03-17 PROCEDURE — 99406 BEHAV CHNG SMOKING 3-10 MIN: CPT

## 2023-03-17 PROCEDURE — 700101 HCHG RX REV CODE 250: Performed by: NURSE PRACTITIONER

## 2023-03-17 PROCEDURE — 94664 DEMO&/EVAL PT USE INHALER: CPT

## 2023-03-17 PROCEDURE — 99233 SBSQ HOSP IP/OBS HIGH 50: CPT | Performed by: STUDENT IN AN ORGANIZED HEALTH CARE EDUCATION/TRAINING PROGRAM

## 2023-03-17 RX ORDER — OXYCODONE HYDROCHLORIDE 5 MG/1
5 TABLET ORAL
Status: DISCONTINUED | OUTPATIENT
Start: 2023-03-17 | End: 2023-03-19 | Stop reason: HOSPADM

## 2023-03-17 RX ORDER — MORPHINE SULFATE 4 MG/ML
1 INJECTION INTRAVENOUS EVERY 4 HOURS PRN
Status: COMPLETED | OUTPATIENT
Start: 2023-03-17 | End: 2023-03-17

## 2023-03-17 RX ORDER — TRAZODONE HYDROCHLORIDE 50 MG/1
50 TABLET ORAL NIGHTLY
Status: DISCONTINUED | OUTPATIENT
Start: 2023-03-17 | End: 2023-03-19 | Stop reason: HOSPADM

## 2023-03-17 RX ORDER — ONDANSETRON 2 MG/ML
4 INJECTION INTRAMUSCULAR; INTRAVENOUS EVERY 4 HOURS PRN
Status: DISCONTINUED | OUTPATIENT
Start: 2023-03-17 | End: 2023-03-19 | Stop reason: HOSPADM

## 2023-03-17 RX ORDER — OXYCODONE HYDROCHLORIDE 5 MG/1
2.5 TABLET ORAL
Status: DISCONTINUED | OUTPATIENT
Start: 2023-03-17 | End: 2023-03-19 | Stop reason: HOSPADM

## 2023-03-17 RX ORDER — METRONIDAZOLE 500 MG/100ML
500 INJECTION, SOLUTION INTRAVENOUS EVERY 8 HOURS
Status: DISCONTINUED | OUTPATIENT
Start: 2023-03-17 | End: 2023-03-17

## 2023-03-17 RX ORDER — OXYCODONE HYDROCHLORIDE 5 MG/1
5 TABLET ORAL EVERY 4 HOURS PRN
Status: DISCONTINUED | OUTPATIENT
Start: 2023-03-17 | End: 2023-03-17

## 2023-03-17 RX ORDER — LORAZEPAM 0.5 MG/1
0.5 TABLET ORAL EVERY 6 HOURS PRN
Status: DISPENSED | OUTPATIENT
Start: 2023-03-17 | End: 2023-03-18

## 2023-03-17 RX ORDER — HYDROMORPHONE HYDROCHLORIDE 1 MG/ML
0.25 INJECTION, SOLUTION INTRAMUSCULAR; INTRAVENOUS; SUBCUTANEOUS
Status: DISCONTINUED | OUTPATIENT
Start: 2023-03-17 | End: 2023-03-19

## 2023-03-17 RX ADMIN — LAMOTRIGINE 100 MG: 100 TABLET ORAL at 17:15

## 2023-03-17 RX ADMIN — HYDROMORPHONE HYDROCHLORIDE 0.25 MG: 1 INJECTION, SOLUTION INTRAMUSCULAR; INTRAVENOUS; SUBCUTANEOUS at 19:47

## 2023-03-17 RX ADMIN — METRONIDAZOLE 500 MG: 5 INJECTION, SOLUTION INTRAVENOUS at 05:38

## 2023-03-17 RX ADMIN — OXYCODONE HYDROCHLORIDE 5 MG: 5 TABLET ORAL at 15:48

## 2023-03-17 RX ADMIN — OXYCODONE HYDROCHLORIDE 5 MG: 5 TABLET ORAL at 20:39

## 2023-03-17 RX ADMIN — POLYETHYLENE GLYCOL 3350, SODIUM SULFATE ANHYDROUS, SODIUM BICARBONATE, SODIUM CHLORIDE, POTASSIUM CHLORIDE 4 L: 236; 22.74; 6.74; 5.86; 2.97 POWDER, FOR SOLUTION ORAL at 18:24

## 2023-03-17 RX ADMIN — LORAZEPAM 0.5 MG: 0.5 TABLET ORAL at 20:39

## 2023-03-17 RX ADMIN — ONDANSETRON HYDROCHLORIDE 4 MG: 2 SOLUTION INTRAMUSCULAR; INTRAVENOUS at 21:05

## 2023-03-17 RX ADMIN — MORPHINE SULFATE 1 MG: 4 INJECTION INTRAVENOUS at 09:53

## 2023-03-17 RX ADMIN — PANTOPRAZOLE SODIUM 40 MG: 40 INJECTION, POWDER, FOR SOLUTION INTRAVENOUS at 05:33

## 2023-03-17 RX ADMIN — HYDROMORPHONE HYDROCHLORIDE 0.25 MG: 1 INJECTION, SOLUTION INTRAMUSCULAR; INTRAVENOUS; SUBCUTANEOUS at 17:15

## 2023-03-17 RX ADMIN — PANTOPRAZOLE SODIUM 40 MG: 40 INJECTION, POWDER, FOR SOLUTION INTRAVENOUS at 17:15

## 2023-03-17 RX ADMIN — TRAZODONE HYDROCHLORIDE 50 MG: 50 TABLET ORAL at 02:09

## 2023-03-17 RX ADMIN — MORPHINE SULFATE 1 MG: 4 INJECTION INTRAVENOUS at 05:26

## 2023-03-17 RX ADMIN — LEVETIRACETAM 1000 MG: 500 TABLET, FILM COATED ORAL at 05:29

## 2023-03-17 RX ADMIN — OXYCODONE HYDROCHLORIDE 5 MG: 5 TABLET ORAL at 03:55

## 2023-03-17 RX ADMIN — LEVETIRACETAM 1000 MG: 500 TABLET, FILM COATED ORAL at 17:15

## 2023-03-17 RX ADMIN — OXYCODONE HYDROCHLORIDE 5 MG: 5 TABLET ORAL at 11:46

## 2023-03-17 RX ADMIN — LORAZEPAM 0.5 MG: 0.5 TABLET ORAL at 02:09

## 2023-03-17 RX ADMIN — MIRTAZAPINE 30 MG: 15 TABLET, FILM COATED ORAL at 21:05

## 2023-03-17 RX ADMIN — TRAZODONE HYDROCHLORIDE 50 MG: 50 TABLET ORAL at 21:05

## 2023-03-17 RX ADMIN — LAMOTRIGINE 100 MG: 100 TABLET ORAL at 05:29

## 2023-03-17 RX ADMIN — OXYCODONE HYDROCHLORIDE 5 MG: 5 TABLET ORAL at 23:37

## 2023-03-17 RX ADMIN — CEFTRIAXONE SODIUM 1000 MG: 10 INJECTION, POWDER, FOR SOLUTION INTRAVENOUS at 06:30

## 2023-03-17 RX ADMIN — MORPHINE SULFATE 1 MG: 4 INJECTION INTRAVENOUS at 00:53

## 2023-03-17 RX ADMIN — LORAZEPAM 0.5 MG: 0.5 TABLET ORAL at 11:58

## 2023-03-17 ASSESSMENT — ENCOUNTER SYMPTOMS
BLURRED VISION: 0
HEADACHES: 0
PALPITATIONS: 0
DEPRESSION: 0
COUGH: 0
ABDOMINAL PAIN: 1
BRUISES/BLEEDS EASILY: 0
NECK PAIN: 0
DOUBLE VISION: 0
BACK PAIN: 0
HEARTBURN: 0
CHILLS: 0
FEVER: 1
WEAKNESS: 0
COUGH: 1
DIARRHEA: 1
SHORTNESS OF BREATH: 1
MYALGIAS: 0
HEMOPTYSIS: 0
CONSTIPATION: 0
BLOOD IN STOOL: 1
CHILLS: 1
DIZZINESS: 0
VOMITING: 1
NAUSEA: 1
BLOOD IN STOOL: 0
FEVER: 0

## 2023-03-17 ASSESSMENT — PAIN DESCRIPTION - PAIN TYPE
TYPE: ACUTE PAIN

## 2023-03-17 ASSESSMENT — FIBROSIS 4 INDEX: FIB4 SCORE: 1.88

## 2023-03-17 NOTE — PROGRESS NOTES
Report received from night shift RN, assumed care of pt. Pt A&Ox4. Plan of care discussed with pt, labs and chart reviewed. All needs met at this time. Tele box on. On room air. Call light within reach, bed locked and in lowest position. All fall precautions and hourly rounding in place.

## 2023-03-17 NOTE — ASSESSMENT & PLAN NOTE
Serial abdominal exams  IV Protonix 40 mg twice daily  Serial H&H  Transfuse less than 7  GI consultation in a.m.  -Resolved

## 2023-03-17 NOTE — ASSESSMENT & PLAN NOTE
Patient presents to outside facility with black tarry stools for the last 3 days.  Occasional ibuprofen use.  EGD done at outside facility on 3/12 reportedly negative.  General surgery at [facility] recommended transfer for GI consultation  IV Protonix twice daily  Serial H&H  Transfuse less than 7  Avoid NSAIDs and anticoagulants  Serial abdominal exams    GI consultation in a.m.  -Resolved

## 2023-03-17 NOTE — H&P
Hospital Medicine History & Physical Note    Date of Service  3/16/2023    Primary Care Physician  No primary care provider on file.    Consultants  none    Specialist Names: none    Code Status  Full Code    Chief Complaint  Hematemesis and melena      History of Presenting Illness  Shaista Pichardo is a 53 y.o. female past medical history of COPD, epilepsy, depression/anxiety who presented 3/16/2023 at outside facility with melena and hematemesis for the last 3 days.  She does report occasional ibuprofen use.  She denies being on any blood thinners or any trauma.  Denies any fevers or chills.  Complaining of mild abdominal pain not relieved or exacerbated with anything.  In the ER at outside facility she was treated with IV antibiotics for colitis.  Her guaiac was positive.  EGD done at outside facility on 3/12/2023 was negative, general surgeon at [facility] believe patient needs GI.  Her hemoglobin has trended down from 16 on admission to 12.  Admitted to medicine service.    I discussed the plan of care with patient.    Review of Systems  Review of Systems   Constitutional:  Negative for chills and fever.   HENT:  Negative for hearing loss and tinnitus.    Eyes:  Negative for blurred vision and double vision.   Respiratory:  Negative for cough and hemoptysis.    Cardiovascular:  Negative for chest pain and palpitations.   Gastrointestinal:  Positive for abdominal pain, blood in stool, melena, nausea and vomiting. Negative for heartburn.   Genitourinary:  Negative for dysuria and urgency.   Musculoskeletal:  Negative for myalgias and neck pain.   Skin:  Negative for rash.   Neurological:  Negative for dizziness and headaches.   Endo/Heme/Allergies:  Does not bruise/bleed easily.   Psychiatric/Behavioral:  Negative for depression and suicidal ideas.      Past Medical History   has a past medical history of Anxiety, Asthma, Chronic obstructive pulmonary disease (HCC), Congestive heart failure (HCC), Depression,  Epilepsy (HCC), Migraine, and TIA (transient ischemic attack).    Surgical History   has a past surgical history that includes carpal tunnel release (Right) and tubal ligation.     Family History  family history includes Heart Attack in her father, maternal grandmother, and mother; Stroke in her maternal grandmother.   Family history reviewed with patient. There is no family history that is pertinent to the chief complaint.     Social History   reports that she has quit smoking. Her smoking use included cigarettes. She has never used smokeless tobacco. She reports that she does not drink alcohol and does not use drugs.    Allergies  Allergies   Allergen Reactions    Pork Allergy Hives    Toradol Hives    Penicillins      vomiting       Medications  Prior to Admission Medications   Prescriptions Last Dose Informant Patient Reported? Taking?   LORazepam (ATIVAN) 0.5 MG Tab 3/16/2023 at 1600 Patient Yes No   Sig: Take 0.5 mg by mouth every 6 hours as needed for Anxiety.   ipratropium (ATROVENT) 17 MCG/ACT Aero Soln 3/11/2023 Patient Yes No   Sig: Inhale 2 Puffs 4 times a day as needed (shortness of breath).   lamoTRIgine (LAMICTAL) 100 MG Tab 3/16/2023 at 1300 Patient's Home Pharmacy Yes No   Sig: Take 100 mg by mouth every day.   levetiracetam (KEPPRA) 1000 MG tablet 3/16/2023 at 0800 Patient Yes No   Sig: Take 1,000 mg by mouth 2 times a day.   mirtazapine (REMERON) 30 MG Tab tablet 3/15/2023 at 2100 Patient Yes No   Sig: Take 30 mg by mouth every evening.   pantoprazole (PROTONIX) 40 MG Tablet Delayed Response 3/16/2023 at 0800 Patient Yes No   Sig: Take 40 mg by mouth every day.   propranolol (INDERAL) 20 MG Tab 3/16/2023 at 0800 Patient's Home Pharmacy Yes No   Sig: Take 20 mg by mouth 2 times a day.   traZODone (DESYREL) 50 MG Tab 3/15/2023 at 2100  Yes Yes   Sig: Take 50 mg by mouth every evening.      Facility-Administered Medications: None       Physical Exam  Temp:  [36.4 °C (97.5 °F)] 36.4 °C (97.5  °F)  Pulse:  [80] 80  Resp:  [18] 18  BP: (154)/(99) 154/99  SpO2:  [97 %] 97 %  Blood Pressure: (!) 154/99   Temperature: 36.4 °C (97.5 °F)   Pulse: 80   Respiration: 18   Pulse Oximetry: 97 %       Physical Exam  Vitals and nursing note reviewed.   Constitutional:       General: She is not in acute distress.     Appearance: Normal appearance. She is obese.   HENT:      Head: Normocephalic and atraumatic.      Right Ear: Tympanic membrane normal.      Left Ear: Tympanic membrane normal.      Nose: Nose normal.      Mouth/Throat:      Mouth: Mucous membranes are moist.      Pharynx: Oropharynx is clear.   Eyes:      Extraocular Movements: Extraocular movements intact.      Pupils: Pupils are equal, round, and reactive to light.   Cardiovascular:      Rate and Rhythm: Normal rate and regular rhythm.      Pulses: Normal pulses.      Heart sounds: Normal heart sounds.   Pulmonary:      Effort: Pulmonary effort is normal.      Breath sounds: Normal breath sounds.   Abdominal:      General: Bowel sounds are normal. There is no distension.      Palpations: Abdomen is soft. There is no mass.      Tenderness: There is abdominal tenderness.   Musculoskeletal:         General: No swelling or tenderness. Normal range of motion.      Cervical back: Neck supple.   Skin:     General: Skin is warm.      Capillary Refill: Capillary refill takes less than 2 seconds.      Coloration: Skin is not jaundiced or pale.   Neurological:      General: No focal deficit present.      Mental Status: She is alert and oriented to person, place, and time. Mental status is at baseline.      Cranial Nerves: No cranial nerve deficit.      Sensory: No sensory deficit.   Psychiatric:         Mood and Affect: Mood normal.         Behavior: Behavior normal.       Laboratory:          No results for input(s): ALTSGPT, ASTSGOT, ALKPHOSPHAT, TBILIRUBIN, DBILIRUBIN, GAMMAGT, AMYLASE, LIPASE, ALB, PREALBUMIN, GLUCOSE in the last 72 hours.      No results for  input(s): NTPROBNP in the last 72 hours.      No results for input(s): TROPONINT in the last 72 hours.    Imaging:  No orders to display       no X-Ray or EKG requiring interpretation    Assessment/Plan:  Justification for Admission Status  I anticipate this patient will require at least two midnights for appropriate medical management, necessitating inpatient admission because GI bleed    Patient will need a Med/Surg bed on MEDICAL service .  The need is secondary to see above.    * Melena  Assessment & Plan  Patient presents to outside facility with black tarry stools for the last 3 days.  Occasional ibuprofen use.  EGD done at outside facility on 3/12 reportedly negative.  General surgery at [facility] recommended transfer for GI consultation  IV Protonix twice daily  Serial H&H  Transfuse less than 7  Avoid NSAIDs and anticoagulants  Serial abdominal exams    GI consultation in a.m.    Obesity (BMI 30.0-34.9)  Assessment & Plan  BMI 34.82    Encourage healthy lifestyle     Colitis  Assessment & Plan  C/w rocephin and flagyl    Hematemesis  Assessment & Plan  Serial abdominal exams  IV Protonix 40 mg twice daily  Serial H&H  Transfuse less than 7  GI consultation in a.m.    COPD with asthma (HCC)- (present on admission)  Assessment & Plan  Continue with DuoNeb as needed PRN    Seizure disorder (HCC)- (present on admission)  Assessment & Plan  Continue with Keppra and Lamictal  Seizure precautions        VTE prophylaxis: SCDs/TEDs and pharmacologic prophylaxis contraindicated due to melena and hematemesis

## 2023-03-17 NOTE — DISCHARGE PLANNING
CHESTEBAN Dixon met with pt bedside to offer Community Care Management services.   Housing: Pt states she lives in [place] with her son.   Transportation: Pt is not an active  but son does  her to and from her appointments.   Financial: Pt states she is currently working and reports no financial barriers.   Food: Pt reports to receive SNAP benefits.   Follow up appointment: CHW offered to help pt schedule follow up appointment. Pt kindly declined help and states she will schedule on her own.   CHW provided CCM contact information and encouraged pt to call if anything else was needed. CHW will be contacting pt post discharge to follow up.

## 2023-03-17 NOTE — CONSULTS
..Date of Consultation:  3/17/2023    Patient: : Shaista Pichardo  MRN: [id number]    Referring Physician: Dr. Toi Hernández     GI:NATHAN Carbajal     Reason for Consultation: GI bleeding    History of Present Illness: Yessenia Pichardo is a 53-year-old female with past medical history of COPD, GERD, epilepsy, and depression anxiety transferred from [place] on 3/15/2023 secondary to hematemesis and further GI work-up.  Patient reports that for the last month, she started not feeling well and noticed that her stools were dark.  She also had off-and-on intermittent vomiting blood at least 3 times a week on more than one occasion.  She says she went to the hospital several times and was told that nothing was wrong and that she just had an acute bronchitis.  On Sunday, she had a large episode of vomiting with black diarrhea which prompted her to go back to the hospital.  She also describes associated fever, chills, abdominal bloating and continued pain.  Denies hematochezia. She showed me pictures taken on her phone and she did have visual clots with red vomit.    Patient underwent EGD by general surgery on 3/12 with no acute findings.  She did have acute colitis on abdominal/pelvic CT and was started on clear liquid diet as well as Rocephin and Flagyl.  She has not required PRBC transfusion.      In our emergency department, labs significant for hemoglobin 13.4, WBC 3.5.  CMP unremarkable.  She is afebrile and hemodynamically stable.    Patient continues to have significant left lower quadrant sharp abdominal pain with diffuse epigastric and right lower quadrant pain with palpation.  She still having diarrhea.  She reports family history of colon cancer in her grandfather and pancreatic cancer in her grandmother.  Last EGD and colonoscopy was in [place] 2 years ago which showed diverticulosis and hiatal hernia.    Patient was taking daily aspirin but stopped several weeks ago  She drinks alcohol on  occasion and does not smoke cigarettes, but does vape.  No history of drug use.    Past Medical History:   Diagnosis Date    Anxiety     Asthma     Chronic obstructive pulmonary disease (HCC)     Congestive heart failure (HCC)     Depression     Epilepsy (HCC)     Migraine     TIA (transient ischemic attack)          Past Surgical History:   Procedure Laterality Date    CARPAL TUNNEL RELEASE Right     TUBAL LIGATION         Family History   Problem Relation Age of Onset    Heart Attack Mother     Heart Attack Father     Heart Attack Maternal Grandmother     Stroke Maternal Grandmother        Social History     Socioeconomic History    Marital status: Single   Tobacco Use    Smoking status: Former     Types: Cigarettes    Smokeless tobacco: Never    Tobacco comments:     1 cigarette a day   Vaping Use    Vaping Use: Every day    Substances: Nicotine, Flavoring    Devices: Refillable tank   Substance and Sexual Activity    Alcohol use: Never    Drug use: Never       Review of systems:  Review of Systems   Constitutional:  Positive for chills, fever and malaise/fatigue.   HENT:  Negative for hearing loss.    Eyes:  Negative for blurred vision and double vision.   Respiratory:  Positive for cough and shortness of breath.    Cardiovascular:  Negative for chest pain, palpitations and leg swelling.   Gastrointestinal:  Positive for abdominal pain, diarrhea, melena, nausea and vomiting. Negative for blood in stool, constipation and heartburn.   Genitourinary:  Negative for frequency.   Musculoskeletal:  Negative for back pain.   Neurological:  Negative for dizziness and weakness.   Psychiatric/Behavioral:  Negative for depression.    All other systems reviewed and are negative.      Physical Exam:  Vitals:    03/16/23 2114 03/16/23 2320 03/17/23 0351 03/17/23 0800   BP:  (!) 153/98 116/64 94/54   Pulse:  69 80 84   Resp:  18 18 16   Temp:  36.5 °C (97.7 °F) 37.2 °C (99 °F) 36.7 °C (98.1 °F)   TempSrc:  Temporal Temporal  "Temporal   SpO2:  94% 92% 91%   Weight: 94.9 kg (209 lb 3.5 oz)      Height: 1.651 m (5' 5\")          Physical Exam  Vitals and nursing note reviewed.   Constitutional:       General: She is not in acute distress.     Appearance: Normal appearance. She is obese.   HENT:      Head: Normocephalic and atraumatic.      Right Ear: External ear normal.      Left Ear: External ear normal.      Nose: Nose normal.      Mouth/Throat:      Mouth: Mucous membranes are dry.      Pharynx: Oropharynx is clear.   Eyes:      General: No scleral icterus.  Cardiovascular:      Rate and Rhythm: Normal rate and regular rhythm.      Pulses: Normal pulses.      Heart sounds: Normal heart sounds.   Pulmonary:      Effort: Pulmonary effort is normal.      Breath sounds: Normal breath sounds.   Abdominal:      General: Bowel sounds are normal. There is distension.      Tenderness: There is abdominal tenderness.   Musculoskeletal:         General: Normal range of motion.      Cervical back: Normal range of motion.   Skin:     General: Skin is warm and dry.      Capillary Refill: Capillary refill takes less than 2 seconds.      Coloration: Skin is pale.   Neurological:      General: No focal deficit present.      Mental Status: She is oriented to person, place, and time.   Psychiatric:         Mood and Affect: Mood normal.         Behavior: Behavior normal.         Labs:  Recent Labs     03/17/23  0108   WBC 3.5*   RBC 4.03*   HEMOGLOBIN 13.4   HEMATOCRIT 37.9   MCV 94.0   MCH 33.3*   MCHC 35.4*   RDW 43.6   PLATELETCT 172   MPV 10.5     No results for input(s): SODIUM, POTASSIUM, CHLORIDE, CO2, GLUCOSE, BUN, CPKTOTAL in the last 72 hours.                Imaging:  NM-CARDIAC STRESS TEST   Myocardial Perfusion   Report   NUCLEAR IMAGING INTERPRETATION   No evidence of significant jeopardized viable myocardium or prior myocardial    infarction.   Normal left ventricular size, ejection fraction, and wall motion.   ECG INTERPRETATION   Negative " stress ECG for ischemia.     VINCENT AGUIRRE     MRN:    [id number]         Gender:    F     Exam Date: 2021 11:20     Exam Location:      Inpatient     Ordering Phys:     CHRISTOPHER DILL     NucMed Tech:       Kim Mclain RT                      (N), CT     Age:    51    :    1970        Ht (in):     65     Wt (lb):     199    BMI:    33.20       Radiologist     Risk Factors:             Family history of coronary disease, Smoking,                              Hypertension     Indications:              Other chest pain     ICD Codes:                R07.89     Cardiac History:          Positive risk factors, Chest Pain, Palpitations,   Dyspnea     Cardiac Meds:     Meds Past 24 hrs:     Pretest Chest Pain:       No symptoms     STRESS TEST      Pharmacologi                    c   Protoc   Lexiscan       Dose: 0.4 mg   ol:     Post-Injection Exercise:        No exercise followed the intravenous   injection     Resting HR (bpm):      75     Peak HR (bpm):         106     Resting BP (mmHg):       116    /   74     Peak BP (mmHg):       116   /   74     MaxPHR:     169     Target HR (bpm):       144     % MaxPHR:     63     Double Product:       25176     BP Response:     Stress Termination:       Protocol completed     Stress Symptoms:   SOB,     ECG     Resting ECG:     Sinus rhythm.     Stress ECG:      No ischemic changes with Regadenoson.     IMAGE PROTOCOL      Rest/Stress 1                        Day             RadiopharmaceuticalDose (mCi)   Imaging  Date      Imaging  Time           Inj to Img Time (min)   Rest:   Tc-99m             8.4          29-Sep-2021        14:02                   45           Tetrofosmin   Stress: Tc-99m             28.1         29-Sep-2021        15:16                   35           Tetrofosmin     Rest:   Administration Site:       Right hand   Administered by:      Kim ARAMBULA(N), CT     Stress:   Administration Site:       Right hand   Administered by:       Mani Kapadia CNMT     % Percent HR Achieved:   SPECT RESULTS     Technical Quality:       Excellent     Raw Data Analysis:   Summed Stress Score:    0   Summed Rest Score:    0   Summed Difference Score:        0   PERFUSION:   Normal myocardial perfusion with no ischemia.     FUNCTIONAL RESULTS (calculated via Gated SPECT)     Stress Image LV EF:        60     %     Upper Normal Limit     Stress EDV:      77     ml   EDVI:    39      ml/m2     Stress ESV:      31     ml   ESVI:    16      ml/m2     TID:    1.11   TID - 1.19      TID (ed) - 1.23   LV Function:   Normal left ventricular wall motion.  LV ejection fraction = 60%.     Diogenes Dillon   Edited by: Aniceto Mora MD   (Electronically Signed)   Final Date:      2021                     15:45   Amended:         2021 16:58  EC-ECHOCARDIOGRAM COMPLETE W/ CONT  Transthoracic  Echo Report    Echocardiography Laboratory    CONCLUSIONS  No prior study is available for comparison.   Contrast was used to enhance visualization of the endocardial border.  Normal left ventricular systolic function.  The left ventricular ejection fraction is visually estimated to be 60%.  Normal diastolic function.  Normal right ventricular size and systolic function.  No evidence of valvular abnormality based on Doppler evaluation.     VINCENT AGUIRRE  Exam Date:         2021                      07:46  Exam Location:     Inpatient  Priority:          Routine    Ordering Physician:        REGAN FLORES  Referring Physician:  Sonographer:               Alysa CARMEN    Age:    51     Gender:    F  MRN:    [id number]  :    1970  BSA:    2      Ht (in):    66     Wt (lb):    199  Exam Type:     Complete    Indications:     Chest pain, unspecified  ICD Codes:       R07.9    CPT Codes:       93028    BP:   130    /   84     HR:   80  Technical Quality:       Fair    MEASUREMENTS  (Male / Female) Normal Values  2D ECHO  LV Diastolic  Diameter PLAX        4.1 cm                4.2 - 5.9 / 3.9 - 5.3   cm  LV Systolic Diameter PLAX         2.7 cm                2.1 - 4.0 cm  IVS Diastolic Thickness           1.1 cm                  LVPW Diastolic Thickness          0.96 cm                 LVOT Diameter                     2 cm                    Estimated LV Ejection Fraction    60 %                    LV Ejection Fraction MOD BP       65.6 %                >= 55  %  LV Ejection Fraction MOD 4C       66.3 %                  LV Ejection Fraction MOD 2C       62.6 %                  IVC Diameter                      1.1 cm                    DOPPLER  AV Peak Velocity                  0.84 m/s                AV Peak Gradient                  2.8 mmHg                AV Mean Gradient                  1.6 mmHg                LVOT Peak Velocity                0.73 m/s                AV Area Cont Eq vti               2.9 cm2                 Mitral E Point Velocity           0.53 m/s                Mitral E to A Ratio               0.79                    MV Pressure Half Time             57.3 ms                 MV Area PHT                       3.8 cm2                 MV Deceleration Time              198 ms                    * Indicates values subject to auto-interpretation  LV EF:  60    %    FINDINGS  Left Ventricle  Contrast was used to enhance visualization of the endocardial border. 3   mL of contrast was administered. Existing IV was used at the normal   left ventricular chamber size. Mild concentric left ventricular   hypertrophy. Normal left ventricular systolic function. The left   ventricular ejection fraction is visually estimated to be 60%. Normal   regional wall motion. Normal diastolic function. .    Right Ventricle  Normal right ventricular size and systolic function.    Right Atrium  Normal right atrial size. Normal inferior vena cava size and   inspiratory collapse.    Left Atrium  Normal left atrial size.    Mitral Valve  Structurally  normal mitral valve without significant stenosis or   regurgitation.    Aortic Valve  Structurally normal aortic valve without significant stenosis or   regurgitation.    Tricuspid Valve  Structurally normal tricuspid valve without significant stenosis or   regurgitation. Right atrial pressure is estimated to be 3 mmHg.    Pulmonic Valve  Structurally normal pulmonic valve without significant stenosis or   regurgitation.    Pericardium  No pericardial effusion.    Aorta  Normal aortic root for body surface area. The ascending aorta diameter   is 3. 2 cm.    Aniceto Mora MD  (Electronically Signed)  Final Date:     29 September 2021                   09:58  MR-BRAIN-W/O  Narrative: 9/29/2021 12:20 AM    HISTORY/REASON FOR EXAM: Neuro deficit, acute, stroke suspected; Please assess for cause of left-sided weakness and paresthesias    TECHNIQUE/EXAM DESCRIPTION:    T1 sagittal, T2 axial, flair coronal, T1 coronal, and diffusion-weighted axial images were obtained of the brain.    COMPARISON: None.    FINDINGS:    Diffusion-weighted images are normal.  There is no evidence of intracranial mass or mass effect. No evidence of midline shift.  There is no evidence of focal cerebral edema.  There are no extra axial collections.  Visualized intracranial arterial flow voids are within normal limits.  Bone marrow signal in the calvarium is within normal limits.  Included portions of the paranasal sinuses are within normal limits.  Included portions of the mastoid air cells are within normal limits.  Included portions of the orbits are within normal limits  Impression: Brain MRI within normal limits.  [id number] VIEW  Narrative: 9/29/2021 1:56 AM    HISTORY/REASON FOR EXAM:  Abdominal Pain  Weakness    TECHNIQUE/EXAM DESCRIPTION AND NUMBER OF VIEWS:  3 view(s) of the abdomen.    COMPARISON: None    FINDINGS:    Prominent colon with stool and gas    Nonspecific nonobstructive bowel gas pattern. No dilated gas-filled  loop of small bowel.    No portal venous gas or pneumatosis.    No definite free intraperitoneal air but evaluation is limited on supine radiograph.  Impression: No specific finding to suggest small bowel obstruction.      Impressions:  UGI bleed: Differential includes esophagitis, gastritis, AVM, Dieulafoy's, PUD, MWT  Colitis on imaging  History of diverticulosis  COPD  GERD  Epilepsy  Depression/anxiety    MDM:  This is a 53-year-old female transferred from [place] for further gastroenterology work-up secondary to hematemesis, last episode yesterday.  Hemoglobin currently stable.  Colitis seen on imaging at other facility.    Recommendations:  - Maintain 2 large bore IVs (<= 18 gauge)  - Active type and screen  - Recommend fluid resuscitation w/ goal of normalizing heart rate and BUN  - PPI drip 80mg bolus then 8mg/hr or IV BID  - Hgb goal >7g (>9 in setting of active chest pain, ACS), INR <2, plat >50 with active bleeding.  - May have clear liquids today, NPO at midnight  - Plan for EGD tomorrow  -Lipase ordered  -Adjusted pain management regimen  -Requested CT images be pushed through from [place]    GI will continue to follow.    This note was generated using voice recognition software which has a small chance of producing errors of grammar and possibly content. I have made every reasonable attempt to find and correct any obvious errors, but expect that some may not be found prior to finalization of this note.
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
## 2023-03-17 NOTE — RESPIRATORY CARE
"   COPD EDUCATION by COPD CLINICAL EDUCATOR  3/17/2023 at 9:13 AM by Salinas Dominguez RRT     Smoking Cessation Intervention and education completed, 3 minutes spent on smoking cessation education with patient.    Provided smoking cessation packet with \"Tips to Quit\" and brochure for \"Free Virtual Smoking Cessation Classes\".  Spacer provided with instruction.  Declined COPD program.  Action plan updated.  No further discussion.           COPD Assessment  COPD Clinical Specialists ONLY  COPD Education Initiated: Yes--Short Intervention (Declined COPD program at this time.  Was amenable to smoking cessation booklet and handout being left at bedside.)  Referrals Initiated: Yes  Pulmonary Rehab: N/A  Smoking Cessation: Yes (Met with patient briefly, given S/C booklet and handout)  $ Smoking Cessation 3-10 Minutes: Symptomatic (3 minutes..  Patient currently vapes daily.)  Palliative Care:  (not noted)  Hospice: N/A  Home Health Care:  (TBD)  [facility] Outreach: N/A  [facility]: N/A  [facility]: N/A  Private In-Home Care Agency: N/A  Is this a COPD exacerbation patient?: No  $ Demo/Eval of SVN's, MDI's and Aerosols: Yes (Spacer given with instruction.)  (OP) Pulmonary Function Testing:  (no pft found)    PFT Results    No results found for: PFT    Meds to Beds  Would the patient like to opt in for Bedside Medication Delivery at Discharge?: Yes, interested     MY COPD ACTION PLAN     It is recommended that patients and physicians /healthcare providers complete this action plan together. This plan should be discussed at each physician visit and updated as needed.    The green, yellow and red zones show groups of symptoms of COPD. This list of symptoms is not comprehensive, and you may experience other symptoms. In the \"Actions\" column, your healthcare provider has recommended actions for you to take based on your symptoms.    Patient Name: Shaista Pichardo   YOB: 1970   Last " "Updated on: 3/17/2023  9:11 AM   Green Zone:  I am doing well today Actions     Usual activitiy and exercise level   Take daily medications     Usual amounts of cough and phlegm/mucus   Use oxygen as prescribed     Sleep well at night   Continue regular exercise/diet plan     Appetite is good   At all times avoid cigarette smoke, inhaled irritants     Daily Medications (these medications are taken every day):   Ipratropium Bromide (Atrovent) 2 Puffs Four Times daily     Additional Information:  Use the spacer to take this medication.      Yellow Zone:  I am having a bad day or a COPD flare Actions     More breathless than usual   Continue daily medications     I have less energy for my daily activities   Use quick relief inhaler as ordered     Increased or thicker phlegm/mucus   Use oxygen as prescribed     Using quick relief inhaler/nebulizer more often   Get plenty of rest     Swelling of ankles more than usual   Use pursed lip breathing     More coughing than usual   At all times avoid cigarette smoke, inhaled irritants     I feel like I have a \"chest cold\"     Poor sleep and my symptoms woke me up     My appetite is not good     My medicine is not helping      Call provider immediately if symptoms don’t improve     Continue daily medications, add rescue medications:               Medications to be used during a flare up, (as Discussed with Provider):              Red Zone:  I need urgent medical care Actions     Severe shortness of breath even at rest   Call 911 or seek medical care immediately     Not able to do any activity because of breathing      Fever or shaking chills      Feeling confused or very drowsy       Chest pains      Coughing up blood                  "

## 2023-03-17 NOTE — ASSESSMENT & PLAN NOTE
Continue with DuoNeb as needed PRN  Patient was not oxygen dependent however was placed on oxygen during hospitalization and subsequently had her saturations increased to 95 to 96% thus blunting her hypoxic drive she is now requiring oxygen appropriate walk test has been performed DME orders have been placed.

## 2023-03-17 NOTE — CARE PLAN
Problem: Knowledge Deficit - Standard  Goal: Patient and family/care givers will demonstrate understanding of plan of care, disease process/condition, diagnostic tests and medications  Outcome: Progressing     Problem: Pain - Standard  Goal: Alleviation of pain or a reduction in pain to the patient’s comfort goal  Outcome: Progressing   The patient is Stable - Low risk of patient condition declining or worsening    Shift Goals  Clinical Goals: pain management  Patient Goals: pain management, rest    Progress made toward(s) clinical / shift goals:  Patient reported pain 9/10. Patient medicated per the MAR. Patient able to rest with decreased pain.

## 2023-03-17 NOTE — PROGRESS NOTES
Patient DA from [place], arrived by EMS on room air, VSS, ambulated to bed, bed locked and in low position, tele box applied and monitor room notified, call light in reach.

## 2023-03-17 NOTE — CARE PLAN
Problem: Pain - Standard  Goal: Alleviation of pain or a reduction in pain to the patient’s comfort goal  Outcome: Progressing     Problem: Risk for Fluid Volume Deficit Related to Bleeding  Goal: Patient will show no signs and symptoms of excessive bleeding  Outcome: Progressing   The patient is Stable - Low risk of patient condition declining or worsening    Shift Goals  Clinical Goals: pain management, labs  Patient Goals: pain control, rest    Progress made toward(s) clinical / shift goals:   Trending patient labs to monitor for bleeding, pain is being managed with PRN meds per MAR. VSS. Patient ambulating in room independently.           English

## 2023-03-17 NOTE — PROGRESS NOTES
4 Eyes Skin Assessment Completed by Katherine Aguayo, RN and Jyothi Cassidy, JANEY.    Head WDL  Ears WDL  Nose WDL  Mouth WDL  Neck WDL  Breast/Chest WDL  Shoulder Blades WDL  Spine WDL  (R) Arm/Elbow/Hand WDL  (L) Arm/Elbow/Hand WDL  Abdomen WDL  Groin WDL  Scrotum/Coccyx/Buttocks WDL  (R) Leg WDL  (L) Leg WDL  (R) Heel/Foot/Toe Redness and Scab  (L) Heel/Foot/Toe WDL          Devices In Places Tele Box      Interventions In Place N/A    Possible Skin Injury No    Pictures Uploaded Into Epic N/A  Wound Consult Placed N/A  RN Wound Prevention Protocol Ordered No

## 2023-03-18 ENCOUNTER — ANESTHESIA EVENT (OUTPATIENT)
Dept: SURGERY | Facility: MEDICAL CENTER | Age: 53
DRG: 392 | End: 2023-03-18
Payer: MEDICAID

## 2023-03-18 ENCOUNTER — ANESTHESIA (OUTPATIENT)
Dept: SURGERY | Facility: MEDICAL CENTER | Age: 53
DRG: 392 | End: 2023-03-18
Payer: MEDICAID

## 2023-03-18 PROBLEM — K29.60 EROSIVE GASTRITIS: Status: ACTIVE | Noted: 2023-03-18

## 2023-03-18 PROBLEM — K20.90 ESOPHAGITIS DETERMINED BY ENDOSCOPY: Status: ACTIVE | Noted: 2023-03-18

## 2023-03-18 LAB
HCT VFR BLD AUTO: 41.1 % (ref 37–47)
HCT VFR BLD AUTO: 41.4 % (ref 37–47)
HGB BLD-MCNC: 13.9 G/DL (ref 12–16)
HGB BLD-MCNC: 14 G/DL (ref 12–16)

## 2023-03-18 PROCEDURE — RXMED WILLOW AMBULATORY MEDICATION CHARGE: Performed by: STUDENT IN AN ORGANIZED HEALTH CARE EDUCATION/TRAINING PROGRAM

## 2023-03-18 PROCEDURE — 93005 ELECTROCARDIOGRAM TRACING: CPT | Performed by: INTERNAL MEDICINE

## 2023-03-18 PROCEDURE — 700101 HCHG RX REV CODE 250: Performed by: ANESTHESIOLOGY

## 2023-03-18 PROCEDURE — A9270 NON-COVERED ITEM OR SERVICE: HCPCS | Performed by: STUDENT IN AN ORGANIZED HEALTH CARE EDUCATION/TRAINING PROGRAM

## 2023-03-18 PROCEDURE — 0DJ08ZZ INSPECTION OF UPPER INTESTINAL TRACT, VIA NATURAL OR ARTIFICIAL OPENING ENDOSCOPIC: ICD-10-PCS | Performed by: INTERNAL MEDICINE

## 2023-03-18 PROCEDURE — 700111 HCHG RX REV CODE 636 W/ 250 OVERRIDE (IP): Performed by: ANESTHESIOLOGY

## 2023-03-18 PROCEDURE — 160002 HCHG RECOVERY MINUTES (STAT): Performed by: INTERNAL MEDICINE

## 2023-03-18 PROCEDURE — 0DJD8ZZ INSPECTION OF LOWER INTESTINAL TRACT, VIA NATURAL OR ARTIFICIAL OPENING ENDOSCOPIC: ICD-10-PCS | Performed by: INTERNAL MEDICINE

## 2023-03-18 PROCEDURE — 85018 HEMOGLOBIN: CPT

## 2023-03-18 PROCEDURE — 36415 COLL VENOUS BLD VENIPUNCTURE: CPT

## 2023-03-18 PROCEDURE — 700111 HCHG RX REV CODE 636 W/ 250 OVERRIDE (IP): Performed by: STUDENT IN AN ORGANIZED HEALTH CARE EDUCATION/TRAINING PROGRAM

## 2023-03-18 PROCEDURE — 45378 DIAGNOSTIC COLONOSCOPY: CPT | Performed by: INTERNAL MEDICINE

## 2023-03-18 PROCEDURE — 700102 HCHG RX REV CODE 250 W/ 637 OVERRIDE(OP): Performed by: STUDENT IN AN ORGANIZED HEALTH CARE EDUCATION/TRAINING PROGRAM

## 2023-03-18 PROCEDURE — 00813 ANES UPR LWR GI NDSC PX: CPT | Performed by: ANESTHESIOLOGY

## 2023-03-18 PROCEDURE — 85014 HEMATOCRIT: CPT

## 2023-03-18 PROCEDURE — 160203 HCHG ENDO MINUTES - 1ST 30 MINS LEVEL 4: Performed by: INTERNAL MEDICINE

## 2023-03-18 PROCEDURE — 700102 HCHG RX REV CODE 250 W/ 637 OVERRIDE(OP): Performed by: NURSE PRACTITIONER

## 2023-03-18 PROCEDURE — 99232 SBSQ HOSP IP/OBS MODERATE 35: CPT | Performed by: STUDENT IN AN ORGANIZED HEALTH CARE EDUCATION/TRAINING PROGRAM

## 2023-03-18 PROCEDURE — 94640 AIRWAY INHALATION TREATMENT: CPT

## 2023-03-18 PROCEDURE — C9113 INJ PANTOPRAZOLE SODIUM, VIA: HCPCS | Performed by: STUDENT IN AN ORGANIZED HEALTH CARE EDUCATION/TRAINING PROGRAM

## 2023-03-18 PROCEDURE — 43235 EGD DIAGNOSTIC BRUSH WASH: CPT | Mod: 51 | Performed by: INTERNAL MEDICINE

## 2023-03-18 PROCEDURE — 700111 HCHG RX REV CODE 636 W/ 250 OVERRIDE (IP): Performed by: NURSE PRACTITIONER

## 2023-03-18 PROCEDURE — 160048 HCHG OR STATISTICAL LEVEL 1-5: Performed by: INTERNAL MEDICINE

## 2023-03-18 PROCEDURE — 700101 HCHG RX REV CODE 250: Performed by: STUDENT IN AN ORGANIZED HEALTH CARE EDUCATION/TRAINING PROGRAM

## 2023-03-18 PROCEDURE — A9270 NON-COVERED ITEM OR SERVICE: HCPCS | Performed by: NURSE PRACTITIONER

## 2023-03-18 PROCEDURE — 160009 HCHG ANES TIME/MIN: Performed by: INTERNAL MEDICINE

## 2023-03-18 PROCEDURE — 160035 HCHG PACU - 1ST 60 MINS PHASE I: Performed by: INTERNAL MEDICINE

## 2023-03-18 PROCEDURE — 770001 HCHG ROOM/CARE - MED/SURG/GYN PRIV*

## 2023-03-18 PROCEDURE — 700105 HCHG RX REV CODE 258: Performed by: ANESTHESIOLOGY

## 2023-03-18 PROCEDURE — 160036 HCHG PACU - EA ADDL 30 MINS PHASE I: Performed by: INTERNAL MEDICINE

## 2023-03-18 RX ORDER — HYDROMORPHONE HYDROCHLORIDE 1 MG/ML
0.2 INJECTION, SOLUTION INTRAMUSCULAR; INTRAVENOUS; SUBCUTANEOUS
Status: DISCONTINUED | OUTPATIENT
Start: 2023-03-18 | End: 2023-03-18 | Stop reason: HOSPADM

## 2023-03-18 RX ORDER — HYDROMORPHONE HYDROCHLORIDE 1 MG/ML
0.4 INJECTION, SOLUTION INTRAMUSCULAR; INTRAVENOUS; SUBCUTANEOUS
Status: DISCONTINUED | OUTPATIENT
Start: 2023-03-18 | End: 2023-03-18 | Stop reason: HOSPADM

## 2023-03-18 RX ORDER — ONDANSETRON 2 MG/ML
4 INJECTION INTRAMUSCULAR; INTRAVENOUS
Status: DISCONTINUED | OUTPATIENT
Start: 2023-03-18 | End: 2023-03-18 | Stop reason: HOSPADM

## 2023-03-18 RX ORDER — HYDROMORPHONE HYDROCHLORIDE 1 MG/ML
0.5 INJECTION, SOLUTION INTRAMUSCULAR; INTRAVENOUS; SUBCUTANEOUS
Status: DISCONTINUED | OUTPATIENT
Start: 2023-03-18 | End: 2023-03-18 | Stop reason: HOSPADM

## 2023-03-18 RX ORDER — LIDOCAINE HYDROCHLORIDE 20 MG/ML
INJECTION, SOLUTION EPIDURAL; INFILTRATION; INTRACAUDAL; PERINEURAL PRN
Status: DISCONTINUED | OUTPATIENT
Start: 2023-03-18 | End: 2023-03-18 | Stop reason: SURG

## 2023-03-18 RX ORDER — SODIUM CHLORIDE, SODIUM LACTATE, POTASSIUM CHLORIDE, CALCIUM CHLORIDE 600; 310; 30; 20 MG/100ML; MG/100ML; MG/100ML; MG/100ML
INJECTION, SOLUTION INTRAVENOUS
Status: DISCONTINUED | OUTPATIENT
Start: 2023-03-18 | End: 2023-03-18 | Stop reason: SURG

## 2023-03-18 RX ORDER — DIPHENHYDRAMINE HYDROCHLORIDE 50 MG/ML
12.5 INJECTION INTRAMUSCULAR; INTRAVENOUS
Status: DISCONTINUED | OUTPATIENT
Start: 2023-03-18 | End: 2023-03-18 | Stop reason: HOSPADM

## 2023-03-18 RX ORDER — PHENYLEPHRINE HCL IN 0.9% NACL 0.5 MG/5ML
SYRINGE (ML) INTRAVENOUS PRN
Status: DISCONTINUED | OUTPATIENT
Start: 2023-03-18 | End: 2023-03-18 | Stop reason: SURG

## 2023-03-18 RX ORDER — MEPERIDINE HYDROCHLORIDE 25 MG/ML
12.5 INJECTION INTRAMUSCULAR; INTRAVENOUS; SUBCUTANEOUS
Status: DISCONTINUED | OUTPATIENT
Start: 2023-03-18 | End: 2023-03-18 | Stop reason: HOSPADM

## 2023-03-18 RX ORDER — SODIUM CHLORIDE, SODIUM LACTATE, POTASSIUM CHLORIDE, CALCIUM CHLORIDE 600; 310; 30; 20 MG/100ML; MG/100ML; MG/100ML; MG/100ML
INJECTION, SOLUTION INTRAVENOUS CONTINUOUS
Status: DISCONTINUED | OUTPATIENT
Start: 2023-03-18 | End: 2023-03-18 | Stop reason: HOSPADM

## 2023-03-18 RX ORDER — BUDESONIDE 0.25 MG/2ML
250 INHALANT ORAL 2 TIMES DAILY
Qty: 120 ML | Refills: 3 | Status: SHIPPED | OUTPATIENT
Start: 2023-03-18 | End: 2023-04-18

## 2023-03-18 RX ORDER — HYDRALAZINE HYDROCHLORIDE 20 MG/ML
5 INJECTION INTRAMUSCULAR; INTRAVENOUS
Status: DISCONTINUED | OUTPATIENT
Start: 2023-03-18 | End: 2023-03-18 | Stop reason: HOSPADM

## 2023-03-18 RX ORDER — SUCRALFATE 1 G/1
1 TABLET ORAL
Qty: 120 TABLET | Refills: 3 | Status: SHIPPED | OUTPATIENT
Start: 2023-03-18

## 2023-03-18 RX ORDER — FUROSEMIDE 10 MG/ML
40 INJECTION INTRAMUSCULAR; INTRAVENOUS ONCE
Status: COMPLETED | OUTPATIENT
Start: 2023-03-18 | End: 2023-03-18

## 2023-03-18 RX ORDER — HALOPERIDOL 5 MG/ML
1 INJECTION INTRAMUSCULAR
Status: DISCONTINUED | OUTPATIENT
Start: 2023-03-18 | End: 2023-03-18 | Stop reason: HOSPADM

## 2023-03-18 RX ORDER — PANTOPRAZOLE SODIUM 40 MG/1
40 TABLET, DELAYED RELEASE ORAL DAILY
Qty: 30 TABLET | Refills: 3 | Status: SHIPPED | OUTPATIENT
Start: 2023-03-18

## 2023-03-18 RX ORDER — PROPRANOLOL HYDROCHLORIDE 20 MG/1
20 TABLET ORAL 2 TIMES DAILY
Status: DISCONTINUED | OUTPATIENT
Start: 2023-03-18 | End: 2023-03-19 | Stop reason: HOSPADM

## 2023-03-18 RX ADMIN — TRAZODONE HYDROCHLORIDE 50 MG: 50 TABLET ORAL at 20:40

## 2023-03-18 RX ADMIN — OXYCODONE HYDROCHLORIDE 5 MG: 5 TABLET ORAL at 06:11

## 2023-03-18 RX ADMIN — OXYCODONE HYDROCHLORIDE 5 MG: 5 TABLET ORAL at 20:40

## 2023-03-18 RX ADMIN — Medication 200 MCG: at 10:33

## 2023-03-18 RX ADMIN — SODIUM CHLORIDE, POTASSIUM CHLORIDE, SODIUM LACTATE AND CALCIUM CHLORIDE: 600; 310; 30; 20 INJECTION, SOLUTION INTRAVENOUS at 10:20

## 2023-03-18 RX ADMIN — LIDOCAINE HYDROCHLORIDE 100 MG: 20 INJECTION, SOLUTION EPIDURAL; INFILTRATION; INTRACAUDAL at 10:24

## 2023-03-18 RX ADMIN — LAMOTRIGINE 100 MG: 100 TABLET ORAL at 06:10

## 2023-03-18 RX ADMIN — LAMOTRIGINE 100 MG: 100 TABLET ORAL at 17:23

## 2023-03-18 RX ADMIN — MIRTAZAPINE 30 MG: 15 TABLET, FILM COATED ORAL at 20:40

## 2023-03-18 RX ADMIN — IPRATROPIUM BROMIDE 0.5 MG: 0.5 SOLUTION RESPIRATORY (INHALATION) at 04:15

## 2023-03-18 RX ADMIN — HYDROMORPHONE HYDROCHLORIDE 0.25 MG: 1 INJECTION, SOLUTION INTRAMUSCULAR; INTRAVENOUS; SUBCUTANEOUS at 13:44

## 2023-03-18 RX ADMIN — FENTANYL CITRATE 25 MCG: 50 INJECTION, SOLUTION INTRAMUSCULAR; INTRAVENOUS at 11:00

## 2023-03-18 RX ADMIN — PANTOPRAZOLE SODIUM 40 MG: 40 INJECTION, POWDER, FOR SOLUTION INTRAVENOUS at 06:11

## 2023-03-18 RX ADMIN — HYDROMORPHONE HYDROCHLORIDE 0.25 MG: 1 INJECTION, SOLUTION INTRAMUSCULAR; INTRAVENOUS; SUBCUTANEOUS at 17:23

## 2023-03-18 RX ADMIN — OXYCODONE HYDROCHLORIDE 5 MG: 5 TABLET ORAL at 12:27

## 2023-03-18 RX ADMIN — HYDROMORPHONE HYDROCHLORIDE 0.25 MG: 1 INJECTION, SOLUTION INTRAMUSCULAR; INTRAVENOUS; SUBCUTANEOUS at 08:33

## 2023-03-18 RX ADMIN — PROPRANOLOL HYDROCHLORIDE 20 MG: 20 TABLET ORAL at 17:24

## 2023-03-18 RX ADMIN — OXYCODONE HYDROCHLORIDE 5 MG: 5 TABLET ORAL at 16:08

## 2023-03-18 RX ADMIN — HYDROMORPHONE HYDROCHLORIDE 0.25 MG: 1 INJECTION, SOLUTION INTRAMUSCULAR; INTRAVENOUS; SUBCUTANEOUS at 22:57

## 2023-03-18 RX ADMIN — LEVETIRACETAM 1000 MG: 500 TABLET, FILM COATED ORAL at 06:10

## 2023-03-18 RX ADMIN — PANTOPRAZOLE SODIUM 40 MG: 40 INJECTION, POWDER, FOR SOLUTION INTRAVENOUS at 17:23

## 2023-03-18 RX ADMIN — PROPOFOL 50 MG: 10 INJECTION, EMULSION INTRAVENOUS at 10:24

## 2023-03-18 RX ADMIN — FUROSEMIDE 40 MG: 10 INJECTION, SOLUTION INTRAMUSCULAR; INTRAVENOUS at 16:16

## 2023-03-18 RX ADMIN — FENTANYL CITRATE 25 MCG: 50 INJECTION, SOLUTION INTRAMUSCULAR; INTRAVENOUS at 10:45

## 2023-03-18 RX ADMIN — OXYCODONE HYDROCHLORIDE 5 MG: 5 TABLET ORAL at 03:11

## 2023-03-18 RX ADMIN — HYDROMORPHONE HYDROCHLORIDE 0.25 MG: 1 INJECTION, SOLUTION INTRAMUSCULAR; INTRAVENOUS; SUBCUTANEOUS at 00:38

## 2023-03-18 RX ADMIN — LEVETIRACETAM 1000 MG: 500 TABLET, FILM COATED ORAL at 17:23

## 2023-03-18 ASSESSMENT — ENCOUNTER SYMPTOMS
VOMITING: 1
CHILLS: 0
NAUSEA: 1
DIARRHEA: 1
PALPITATIONS: 0
DEPRESSION: 0
ABDOMINAL PAIN: 1
FEVER: 0
CONSTIPATION: 0
WEAKNESS: 0
SHORTNESS OF BREATH: 1
BLURRED VISION: 0
BACK PAIN: 0
DIZZINESS: 0
COUGH: 1
HEARTBURN: 0
BLOOD IN STOOL: 0
DOUBLE VISION: 0

## 2023-03-18 ASSESSMENT — PAIN DESCRIPTION - PAIN TYPE
TYPE: ACUTE PAIN
TYPE: SURGICAL PAIN;ACUTE PAIN
TYPE: ACUTE PAIN
TYPE: SURGICAL PAIN
TYPE: ACUTE PAIN
TYPE: SURGICAL PAIN
TYPE: ACUTE PAIN
TYPE: ACUTE PAIN;SURGICAL PAIN

## 2023-03-18 ASSESSMENT — FIBROSIS 4 INDEX: FIB4 SCORE: 1.88

## 2023-03-18 ASSESSMENT — PAIN SCALES - GENERAL: PAIN_LEVEL: 5

## 2023-03-18 NOTE — OP REPORT
OPERATIVE REPORT    PATIENT:   Shaista Pichardo  1970      PREOPERATIVE DIAGNOSES/INDICATIONS: Anemia, hematemesis    POSTOPERATIVE DIAGNOSIS: 1.  LA grade B esophagitis noted in the esophagus with some abrasions in the back of the pharynx likely causing the hematemesis.  2.  2 cm sliding hiatal hernia.  3.  Erosive gastritis seen in the prepyloric antrum otherwise normal stomach.  No active bleeding noted.  4.  Normal duodenum.  5.  Sigmoid diverticulosis.  Nonbleeding.  6.  Grade 2 nonbleeding internal hemorrhoids.    PROCEDURE:  ESOPHAGOGASTRODUODENOSCOPY  COLONOSCOPY    PHYSICIAN: Pradip Adams MD    ANESTHESIA:  Per anesthesiologist.    LOCATION: [facility]    CONSENT:  OBTAINED. The risks, benefits and alternatives of the procedure were discussed in details. The risks include and are not limited to bleeding, infection, perforation, missed lesions, and sedations risks (cardiopulmonary compromise and allergic reaction to medications).    DESCRIPTION: The patient presented to the procedure room.  After routine checkup was performed, patient was brought into the endoscopy suite.  Patient was placed on his left lateral decubitus position. A bite block was placed in patient's mouth. Patient was sedated by anesthesia.  Vital signs were monitored throughout the procedure.  Oxygenation support was provided throughout the procedure. Upper endoscope was inserted into patient's mouth and advanced to the second portion of the duodenum under direct visualization.  Once the site was reached and examined, the upper endoscope was withdrawn.  Retroflexion was made within the stomach.  The stomach was decompressed, scope was withdrawn.    Patient was then positioned for colonoscopy. Digital rectal examination was performed.  The colonoscope was inserted into patient's anus, advanced to the cecum under direct visualization.  Once the site was reached and examined, the colonoscope was withdrawn and procedure was  terminated. Withdrawal time was greater than 6 minutes to ensure adequate examination.     The patient tolerated the procedures well.  There were no immediate complications.    OPERATIVE FINDINGS:  EGD:  1. Hypopharynx: Abrasions noted with some old blood.  2. Esophagus: LA grade B esophagitis at the GE junction with a 2 cm sliding hiatal hernia.  3. Stomach: Erosive prepyloric antral gastritis.  No active bleeding noted.  4. Duodenum: Normal.    COLON:  The quality of the bowel preparation was  adequate.      Nonbleeding sigmoid diverticulosis and grade 2 nonbleeding internal hemorrhoids otherwise unremarkable colonoscopy.      RECOMMENDATIONS:  1.  Maintain on PPI.  Trend hemoglobin.  2.  No further inpatient intervention anticipated or planned at this time.    This note has been transcribed with digital voice recognition software and although it has been reviewed may contain grammatical or word errors

## 2023-03-18 NOTE — ANESTHESIA TIME REPORT
Anesthesia Start and Stop Event Times     Date Time Event    3/18/2023 0934 Ready for Procedure     1020 Anesthesia Start     1042 Anesthesia Stop        Responsible Staff  03/18/23    Name Role Begin End    Maksim Rodriguez M.D. Anesth 1020 1042        Overtime Reason:  no overtime (within assigned shift)    Comments:

## 2023-03-18 NOTE — PROGRESS NOTES
[facility] Medicine Daily Progress Note    Date of Service  3/18/2023    Chief Complaint  Shaista Pichardo is a 53 y.o. female admitted 3/16/2023 with abdominal pain    Hospital Course  Shaista Pichardo is a 53 y.o. female past medical history of COPD, epilepsy, depression/anxiety who presented 3/16/2023 at outside facility with melena and hematemesis for the last 3 days.  She does report occasional ibuprofen use.  She denies being on any blood thinners or any trauma.  Denies any fevers or chills.  Complaining of mild abdominal pain not relieved or exacerbated with anything.  In the ER at outside facility she was treated with IV antibiotics for colitis.  Her guaiac was positive.  EGD done at outside facility on 3/12/2023 was negative, general surgeon at [place] believe patient needs GI.  Her hemoglobin has trended down from 16 on admission to 12.  Admitted to medicine service.    Interval Problem Update  3/17/2023:  Continue present medical management appreciate GI recommendations patient is planned for EGD on 3/18/2023.  N.p.o. at midnight.  Continue to hold anticoagulation.  Suspicion for possible colitis versus diverticulitis although patient has no leukocytosis.  Continue with transfusion thresholds continue to trend hemoglobin as appropriate no overt signs of bleeding at present.  3/18/2023:  Patient had EGD performed today findings were significant for erosive gastritis in addition to esophagitis distally.  GI recommendations appreciated.  Moreover patient is on non-O2 dependent COPD however was placed on oxygen during hospitalization resulting in oxygen dependence now.  Appropriate walk test has been performed DME orders been placed for supplemental oxygen upon discharge.  All medications have been reconciled.  Lab values reviewed IV Lasix ordered.  Anticipate discharge 3/19/2023.  I have discussed this patient's plan of care and discharge plan at IDT rounds today with Case Management, Nursing, Nursing  leadership, and other members of the IDT team.    Consultants/Specialty  GI    Code Status  Full Code    Disposition  Patient is not medically cleared for discharge.   Anticipate discharge to to home with close outpatient follow-up.  I have placed the appropriate orders for post-discharge needs.    Review of Systems  Review of Systems   Constitutional:  Positive for malaise/fatigue. Negative for chills and fever.   HENT:  Negative for hearing loss.    Eyes:  Negative for blurred vision and double vision.   Respiratory:  Positive for cough and shortness of breath.    Cardiovascular:  Negative for chest pain, palpitations and leg swelling.   Gastrointestinal:  Positive for abdominal pain, diarrhea, melena, nausea and vomiting. Negative for blood in stool, constipation and heartburn.   Genitourinary:  Negative for frequency.   Musculoskeletal:  Negative for back pain.   Neurological:  Negative for dizziness and weakness.   Psychiatric/Behavioral:  Negative for depression.    All other systems reviewed and are negative.     Physical Exam  Temp:  [36.1 °C (97 °F)-37 °C (98.6 °F)] 36.6 °C (97.9 °F)  Pulse:  [] 87  Resp:  [12-18] 16  BP: ()/(51-81) 97/52  SpO2:  [86 %-98 %] 92 %    Physical Exam  Vitals and nursing note reviewed.   Constitutional:       General: She is not in acute distress.     Appearance: Normal appearance. She is obese.   HENT:      Head: Normocephalic and atraumatic.      Right Ear: Tympanic membrane normal.      Left Ear: Tympanic membrane normal.      Nose: Nose normal.      Mouth/Throat:      Mouth: Mucous membranes are moist.      Pharynx: Oropharynx is clear.   Eyes:      Extraocular Movements: Extraocular movements intact.      Pupils: Pupils are equal, round, and reactive to light.   Cardiovascular:      Rate and Rhythm: Normal rate and regular rhythm.      Pulses: Normal pulses.      Heart sounds: Normal heart sounds.   Pulmonary:      Effort: Pulmonary effort is normal.      Breath  sounds: Normal breath sounds.   Abdominal:      General: Bowel sounds are normal. There is no distension.      Palpations: Abdomen is soft. There is no mass.      Tenderness: There is abdominal tenderness.   Musculoskeletal:         General: No swelling or tenderness. Normal range of motion.      Cervical back: Neck supple.   Skin:     General: Skin is warm.      Capillary Refill: Capillary refill takes less than 2 seconds.      Coloration: Skin is not jaundiced or pale.   Neurological:      General: No focal deficit present.      Mental Status: She is alert and oriented to person, place, and time. Mental status is at baseline.      Cranial Nerves: No cranial nerve deficit.      Sensory: No sensory deficit.   Psychiatric:         Mood and Affect: Mood normal.         Behavior: Behavior normal.       Fluids    Intake/Output Summary (Last 24 hours) at 3/18/2023 1630  Last data filed at 3/18/2023 1036  Gross per 24 hour   Intake 510 ml   Output 50 ml   Net 460 ml       Laboratory  Recent Labs     03/17/23  0108 03/17/23  0909 03/17/23  1655 03/18/23  0107 03/18/23  0850   WBC 3.5*  --   --   --   --    RBC 4.03*  --   --   --   --    HEMOGLOBIN 13.4   < > 14.7 14.0 13.9   HEMATOCRIT 37.9   < > 41.5 41.4 41.1   MCV 94.0  --   --   --   --    MCH 33.3*  --   --   --   --    MCHC 35.4*  --   --   --   --    RDW 43.6  --   --   --   --    PLATELETCT 172  --   --   --   --    MPV 10.5  --   --   --   --     < > = values in this interval not displayed.     Recent Labs     03/17/23  0909   SODIUM 139   POTASSIUM 3.7   CHLORIDE 107   CO2 22   GLUCOSE 103*   BUN 3*   CREATININE 0.69   CALCIUM 9.4                   Imaging  OUTSIDE IMAGES-CT ABDOMEN /PELVIS   Final Result      OUTSIDE IMAGES-CT FACE   Final Result      OUTSIDE IMAGES-CT CHEST   Final Result      OUTSIDE IMAGES-DX CHEST   Final Result           Assessment/Plan  * Melena  Assessment & Plan  Patient presents to outside facility with black tarry stools for the last  3 days.  Occasional ibuprofen use.  EGD done at outside facility on 3/12 reportedly negative.  General surgery at [facility] recommended transfer for GI consultation  IV Protonix twice daily  Serial H&H  Transfuse less than 7  Avoid NSAIDs and anticoagulants  Serial abdominal exams    GI consultation in a.m.  -Resolved    Erosive gastritis  Assessment & Plan  As evidenced on EGD today GI recommendations appreciated continue Protonix I have added sucralfate.  No evidence of active bleeding    Esophagitis determined by endoscopy  Assessment & Plan  Status post EGD continue omeprazole counseled against vaping products.    Obesity (BMI 30.0-34.9)  Assessment & Plan  BMI 34.82    Encourage healthy lifestyle     Colitis  Assessment & Plan  C/w rocephin and flagyl    Hematemesis  Assessment & Plan  Serial abdominal exams  IV Protonix 40 mg twice daily  Serial H&H  Transfuse less than 7  GI consultation in a.m.  -Resolved    COPD with asthma (HCC)- (present on admission)  Assessment & Plan  Continue with DuoNeb as needed PRN  Patient was not oxygen dependent however was placed on oxygen during hospitalization and subsequently had her saturations increased to 95 to 96% thus blunting her hypoxic drive she is now requiring oxygen appropriate walk test has been performed DME orders have been placed.    Seizure disorder (HCC)- (present on admission)  Assessment & Plan  Continue with Keppra and Lamictal  Seizure precautions       Please note that this dictation was created using voice recognition software. I have made every reasonable attempt to correct obvious errors, but I expect that there are errors of grammar and possibly context that I did not discover before finalizing the note.    VTE prophylaxis: SCDs/TEDs    I have performed a physical exam and reviewed and updated ROS and Plan today (3/18/2023). In review of yesterday's note (3/17/2023), there are no changes except as documented above.

## 2023-03-18 NOTE — OR NURSING
1040: Pt arrives to PACU asleep and calm. VSS.    1140: Pt states abdominal pain is at baseline, denies nausea. Report called to floor RN.

## 2023-03-18 NOTE — HOSPITAL COURSE
Shaista Pichardo is a 53 y.o. female past medical history of COPD, epilepsy, depression/anxiety who presented 3/16/2023 at outside facility with melena and hematemesis for the last 3 days.  She does report occasional ibuprofen use.  She denies being on any blood thinners or any trauma.  Denies any fevers or chills.  Complaining of mild abdominal pain not relieved or exacerbated with anything.  In the ER at outside facility she was treated with IV antibiotics for colitis.  Her guaiac was positive.  EGD done at outside facility on 3/12/2023 was negative, general surgeon at [place] believe patient needs GI.  Her hemoglobin has trended down from 16 on admission to 12.  Admitted to medicine service.

## 2023-03-18 NOTE — ASSESSMENT & PLAN NOTE
As evidenced on EGD today GI recommendations appreciated continue Protonix I have added sucralfate.  No evidence of active bleeding

## 2023-03-18 NOTE — ANESTHESIA PREPROCEDURE EVALUATION
Case: [id number] Date/Time: 03/18/23 1010    Procedure: EGD, WITH COLONOSCOPY    Location: [facility] OR  / SURGERY [facility]    Surgeons: Pradip Adams M.D.        TTE and stress test results from 2021 reviewed. Admitted with evidence of GI bleeding.    Relevant Problems   PULMONARY   (positive) COPD with asthma (HCC)      NEURO   (positive) Seizure disorder (HCC)      CARDIAC   (positive) CHF (congestive heart failure) ([facility])       Physical Exam    Airway   Mallampati: II  TM distance: >3 FB  Neck ROM: full       Cardiovascular - normal exam  Rhythm: regular  Rate: normal  (-) murmur     Dental - normal exam           Pulmonary - normal exam  Breath sounds clear to auscultation     Abdominal    Neurological - normal exam                 Anesthesia Plan    ASA 3   ASA physical status 3 criteria: COPD    Plan - MAC               Induction: intravenous          Informed Consent:    Anesthetic plan and risks discussed with patient.    Use of blood products discussed with: patient whom consented to blood products.

## 2023-03-18 NOTE — CARE PLAN
Problem: Pain - Standard  Goal: Alleviation of pain or a reduction in pain to the patient’s comfort goal  Outcome: Progressing     Problem: Risk for Fluid Volume Deficit Related to Bleeding  Goal: Fluid volume balance will be maintained  Outcome: Progressing  Goal: Patient will show no signs and symptoms of excessive bleeding  Outcome: Progressing   The patient is Stable - Low risk of patient condition declining or worsening    Shift Goals  Clinical Goals: Monitor VS, pain management, bowel prep  Patient Goals: pain control, rest    Progress made toward(s) clinical / shift goals:   Patient complains of 7-9/10 pain throughout the night, medicated per MAR. VSS. Trending H&H to monitor for blood loss, patient not having bloody stools, having hematemesis- but in small amount. Patient able to tolerate the clear liquid diet prior to becoming NPO.

## 2023-03-18 NOTE — FACE TO FACE
"Face to Face Note  -  Durable Medical Equipment    Toi Choe M.D. - NPI: [id number]  I certify that this patient is under my care and that they had a durable medical equipment(DME)face to face encounter by myself that meets the physician DME face-to-face encounter requirements with this patient on:    Date of encounter:   Patient:                    MRN:                       YOB: 2023  Shaista Pichardo  [id number]  1970     The encounter with the patient was in whole, or in part, for the following medical condition, which is the primary reason for durable medical equipment:  COPD    I certify that, based on my findings, the following durable medical equipment is medically necessary:    Oxygen   HOME O2 Saturation Measurements:(Values must be present for Home Oxygen orders)  Room air sat at rest: 84  Room air sat with amb: 82  With liters of O2: 6, O2 sat at rest with O2: 90  With Liters of O2: 6, O2 sat with amb with O2 : 89  Is the patient mobile?: Yes  If patient feels more short of breath, they can go up to 6 liters per minute and contact healthcare provider.    Supporting Symptoms: The patient requires supplemental oxygen, as the following interventions have been tried with limited or no improvement: \"Ambulation with oximetry.    My Clinical findings support the need for the above equipment due to:  Hypoxia  "

## 2023-03-18 NOTE — CARE PLAN
The patient is Stable - Low risk of patient condition declining or worsening    Shift Goals  Clinical Goals: EGD, monitor O2/ titrate down, monitor labs  Patient Goals: EGD, shower/bed bath, walk  Family Goals: ODALYS, no family present    Progress made toward(s) clinical / shift goals:      Problem: Knowledge Deficit - Standard  Goal: Patient and family/care givers will demonstrate understanding of plan of care, disease process/condition, diagnostic tests and medications    Problem: Risk for Fluid Volume Deficit Related to Bleeding  Goal: Fluid volume balance will be maintained    Problem: Risk for Fluid Volume Deficit Related to Bleeding  Goal: Patient will show no signs and symptoms of excessive bleeding     Patient's EGD was completed today and GI signed off. Patient has been ambulatory to bathroom and tolerates activity well. Patient able to demonstrate an understanding of plan of care.     Patient is not progressing towards the following goals:      Problem: Pain - Standard  Goal: Alleviation of pain or a reduction in pain to the patient’s comfort goal  Outcome: Not Progressing    Patient pain has not been managed adequately. All non-pharmacological and pharmacological measures were completed and ineffective for patient's 9/10 abdominal pain. Patient has a pain goal of 3/10 and wants comfort with movement.

## 2023-03-18 NOTE — PROGRESS NOTES
Notified provider Sharon Fuentes regarding patient O2 needs going from RA to 5 L. Patient states she is short of breath and little dizzy. Paged RT to get breathing tx. Will continue to monitor. Doctor wants update after breathing tx.

## 2023-03-18 NOTE — PROGRESS NOTES
[facility] Medicine Daily Progress Note    Date of Service  3/17/2023    Chief Complaint  Shaista Pichardo is a 53 y.o. female admitted 3/16/2023 with abdominal pain    Hospital Course  Shaista Pichardo is a 53 y.o. female past medical history of COPD, epilepsy, depression/anxiety who presented 3/16/2023 at outside facility with melena and hematemesis for the last 3 days.  She does report occasional ibuprofen use.  She denies being on any blood thinners or any trauma.  Denies any fevers or chills.  Complaining of mild abdominal pain not relieved or exacerbated with anything.  In the ER at outside facility she was treated with IV antibiotics for colitis.  Her guaiac was positive.  EGD done at outside facility on 3/12/2023 was negative, general surgeon at [place] believe patient needs GI.  Her hemoglobin has trended down from 16 on admission to 12.  Admitted to medicine service.    Interval Problem Update  3/17/2023:  Continue present medical management appreciate GI recommendations patient is planned for EGD on 3/18/2023.  N.p.o. at midnight.  Continue to hold anticoagulation.  Suspicion for possible colitis versus diverticulitis although patient has no leukocytosis.  Continue with transfusion thresholds continue to trend hemoglobin as appropriate no overt signs of bleeding at present.    I have discussed this patient's plan of care and discharge plan at IDT rounds today with Case Management, Nursing, Nursing leadership, and other members of the IDT team.    Consultants/Specialty  GI    Code Status  Full Code    Disposition  Patient is not medically cleared for discharge.   Anticipate discharge to to home with close outpatient follow-up.  I have placed the appropriate orders for post-discharge needs.    Review of Systems  Review of Systems   Constitutional:  Positive for malaise/fatigue. Negative for chills and fever.   HENT:  Negative for hearing loss.    Eyes:  Negative for blurred vision and double vision.    Respiratory:  Positive for cough and shortness of breath.    Cardiovascular:  Negative for chest pain, palpitations and leg swelling.   Gastrointestinal:  Positive for abdominal pain, diarrhea, melena, nausea and vomiting. Negative for blood in stool, constipation and heartburn.   Genitourinary:  Negative for frequency.   Musculoskeletal:  Negative for back pain.   Neurological:  Negative for dizziness and weakness.   Psychiatric/Behavioral:  Negative for depression.    All other systems reviewed and are negative.     Physical Exam  Temp:  [36.1 °C (97 °F)-37.2 °C (99 °F)] 36.8 °C (98.2 °F)  Pulse:  [] 100  Resp:  [16-18] 16  BP: ()/(54-99) 121/67  SpO2:  [88 %-97 %] 90 %    Physical Exam  Vitals and nursing note reviewed.   Constitutional:       General: She is not in acute distress.     Appearance: Normal appearance. She is obese.   HENT:      Head: Normocephalic and atraumatic.      Right Ear: Tympanic membrane normal.      Left Ear: Tympanic membrane normal.      Nose: Nose normal.      Mouth/Throat:      Mouth: Mucous membranes are moist.      Pharynx: Oropharynx is clear.   Eyes:      Extraocular Movements: Extraocular movements intact.      Pupils: Pupils are equal, round, and reactive to light.   Cardiovascular:      Rate and Rhythm: Normal rate and regular rhythm.      Pulses: Normal pulses.      Heart sounds: Normal heart sounds.   Pulmonary:      Effort: Pulmonary effort is normal.      Breath sounds: Normal breath sounds.   Abdominal:      General: Bowel sounds are normal. There is no distension.      Palpations: Abdomen is soft. There is no mass.      Tenderness: There is abdominal tenderness.   Musculoskeletal:         General: No swelling or tenderness. Normal range of motion.      Cervical back: Neck supple.   Skin:     General: Skin is warm.      Capillary Refill: Capillary refill takes less than 2 seconds.      Coloration: Skin is not jaundiced or pale.   Neurological:      General:  No focal deficit present.      Mental Status: She is alert and oriented to person, place, and time. Mental status is at baseline.      Cranial Nerves: No cranial nerve deficit.      Sensory: No sensory deficit.   Psychiatric:         Mood and Affect: Mood normal.         Behavior: Behavior normal.       Fluids    Intake/Output Summary (Last 24 hours) at 3/17/2023 1746  Last data filed at 3/17/2023 1200  Gross per 24 hour   Intake 920 ml   Output --   Net 920 ml       Laboratory  Recent Labs     03/17/23  0108 03/17/23  0909 03/17/23  1655   WBC 3.5*  --   --    RBC 4.03*  --   --    HEMOGLOBIN 13.4 13.4 14.7   HEMATOCRIT 37.9 38.9 41.5   MCV 94.0  --   --    MCH 33.3*  --   --    MCHC 35.4*  --   --    RDW 43.6  --   --    PLATELETCT 172  --   --    MPV 10.5  --   --      Recent Labs     03/17/23  0909   SODIUM 139   POTASSIUM 3.7   CHLORIDE 107   CO2 22   GLUCOSE 103*   BUN 3*   CREATININE 0.69   CALCIUM 9.4                   Imaging  OUTSIDE IMAGES-CT ABDOMEN /PELVIS   Final Result      OUTSIDE IMAGES-CT FACE   Final Result      OUTSIDE IMAGES-CT CHEST   Final Result      OUTSIDE IMAGES-DX CHEST   Final Result           Assessment/Plan  * Melena  Assessment & Plan  Patient presents to outside facility with black tarry stools for the last 3 days.  Occasional ibuprofen use.  EGD done at outside facility on 3/12 reportedly negative.  General surgery at [facility] recommended transfer for GI consultation  IV Protonix twice daily  Serial H&H  Transfuse less than 7  Avoid NSAIDs and anticoagulants  Serial abdominal exams    GI consultation in a.m.    Obesity (BMI 30.0-34.9)  Assessment & Plan  BMI 34.82    Encourage healthy lifestyle     Colitis  Assessment & Plan  C/w rocephin and flagyl    Hematemesis  Assessment & Plan  Serial abdominal exams  IV Protonix 40 mg twice daily  Serial H&H  Transfuse less than 7  GI consultation in a.m.    COPD with asthma (HCC)- (present on admission)  Assessment & Plan  Continue with DuoNeb  as needed PRN    Seizure disorder (HCC)- (present on admission)  Assessment & Plan  Continue with Keppra and Lamictal  Seizure precautions       Please note that this dictation was created using voice recognition software. I have made every reasonable attempt to correct obvious errors, but I expect that there are errors of grammar and possibly context that I did not discover before finalizing the note.    VTE prophylaxis: SCDs/TEDs    I have performed a physical exam and reviewed and updated ROS and Plan today (3/17/2023). In review of yesterday's note (3/16/2023), there are no changes except as documented above.

## 2023-03-18 NOTE — PROGRESS NOTES
GI note,     EGD/Colon done today.     POSTOPERATIVE DIAGNOSIS:   1.  LA grade B esophagitis noted in the esophagus with some abrasions in the back of the pharynx likely causing the hematemesis.  2.  2 cm sliding hiatal hernia.  3.  Erosive gastritis seen in the prepyloric antrum otherwise normal stomach.  No active bleeding noted.  4.  Normal duodenum.  5.  Sigmoid diverticulosis.  Nonbleeding.  6.  Grade 2 nonbleeding internal hemorrhoids.    Rec:  PPI bid 40mg for 4 weeks then taper by PCP.   OKay to add sucrafate to help healing.   No other GI intervention is needed.   GI team signs off on 03/18.

## 2023-03-18 NOTE — ANESTHESIA POSTPROCEDURE EVALUATION
Patient: Shaista Pichardo    Procedure Summary     Date: 03/18/23 Room / Location: [facility] OR 05 / SURGERY [facility]    Anesthesia Start: 1020 Anesthesia Stop: 1042    Procedures:       EGD (Esophagus)      COLONOSCOPY (Anus) Diagnosis: (GASTRITIS, ESOPHAGITIS,HIATAL HERNIA, diverticulosis, hemorrhoids)    Surgeons: Pradip Adams M.D. Responsible Provider: Maksim Rodriguez M.D.    Anesthesia Type: MAC ASA Status: 3          Final Anesthesia Type: MAC  Last vitals  BP   Blood Pressure: 125/81    Temp   36.5 °C (97.7 °F)    Pulse   77   Resp   16    SpO2   93 %      Anesthesia Post Evaluation    Patient location during evaluation: PACU  Patient participation: complete - patient participated  Level of consciousness: awake and alert  Pain score: 5    Airway patency: patent  Anesthetic complications: no  Cardiovascular status: hemodynamically stable  Respiratory status: acceptable  Hydration status: euvolemic    PONV: none          No notable events documented.

## 2023-03-18 NOTE — PROGRESS NOTES
Received bedside report from RN, pt care assumed, VSS, pt assessment complete. Pt AAOx4, with 9/10 of pain at this time, medicated per MAR. No signs of acute distress noted at this time. Plan of care discussed with pt and verbalizes no questions. Pt denies any additional needs at this time. Bed locked/in lowest position , pt educated on fall risk and verbalized understanding, call light within reach, hourly rounding initiated.

## 2023-03-19 ENCOUNTER — PHARMACY VISIT (OUTPATIENT)
Dept: PHARMACY | Facility: MEDICAL CENTER | Age: 53
End: 2023-03-19
Payer: COMMERCIAL

## 2023-03-19 VITALS
DIASTOLIC BLOOD PRESSURE: 55 MMHG | WEIGHT: 218.48 LBS | HEIGHT: 65 IN | BODY MASS INDEX: 36.4 KG/M2 | OXYGEN SATURATION: 90 % | HEART RATE: 81 BPM | TEMPERATURE: 98.2 F | RESPIRATION RATE: 15 BRPM | SYSTOLIC BLOOD PRESSURE: 104 MMHG

## 2023-03-19 LAB — EKG IMPRESSION: NORMAL

## 2023-03-19 PROCEDURE — 93010 ELECTROCARDIOGRAM REPORT: CPT | Performed by: INTERNAL MEDICINE

## 2023-03-19 PROCEDURE — 700111 HCHG RX REV CODE 636 W/ 250 OVERRIDE (IP): Performed by: NURSE PRACTITIONER

## 2023-03-19 PROCEDURE — 700102 HCHG RX REV CODE 250 W/ 637 OVERRIDE(OP): Performed by: STUDENT IN AN ORGANIZED HEALTH CARE EDUCATION/TRAINING PROGRAM

## 2023-03-19 PROCEDURE — A9270 NON-COVERED ITEM OR SERVICE: HCPCS | Performed by: STUDENT IN AN ORGANIZED HEALTH CARE EDUCATION/TRAINING PROGRAM

## 2023-03-19 PROCEDURE — 99239 HOSP IP/OBS DSCHRG MGMT >30: CPT | Performed by: STUDENT IN AN ORGANIZED HEALTH CARE EDUCATION/TRAINING PROGRAM

## 2023-03-19 PROCEDURE — 700102 HCHG RX REV CODE 250 W/ 637 OVERRIDE(OP): Performed by: NURSE PRACTITIONER

## 2023-03-19 PROCEDURE — 700111 HCHG RX REV CODE 636 W/ 250 OVERRIDE (IP): Performed by: STUDENT IN AN ORGANIZED HEALTH CARE EDUCATION/TRAINING PROGRAM

## 2023-03-19 PROCEDURE — C9113 INJ PANTOPRAZOLE SODIUM, VIA: HCPCS | Performed by: STUDENT IN AN ORGANIZED HEALTH CARE EDUCATION/TRAINING PROGRAM

## 2023-03-19 PROCEDURE — A9270 NON-COVERED ITEM OR SERVICE: HCPCS | Performed by: NURSE PRACTITIONER

## 2023-03-19 RX ADMIN — OXYCODONE HYDROCHLORIDE 2.5 MG: 5 TABLET ORAL at 12:23

## 2023-03-19 RX ADMIN — OXYCODONE HYDROCHLORIDE 5 MG: 5 TABLET ORAL at 03:05

## 2023-03-19 RX ADMIN — HYDROMORPHONE HYDROCHLORIDE 0.25 MG: 1 INJECTION, SOLUTION INTRAMUSCULAR; INTRAVENOUS; SUBCUTANEOUS at 04:42

## 2023-03-19 RX ADMIN — OXYCODONE HYDROCHLORIDE 5 MG: 5 TABLET ORAL at 08:42

## 2023-03-19 RX ADMIN — PANTOPRAZOLE SODIUM 40 MG: 40 INJECTION, POWDER, FOR SOLUTION INTRAVENOUS at 05:33

## 2023-03-19 RX ADMIN — LAMOTRIGINE 100 MG: 100 TABLET ORAL at 05:33

## 2023-03-19 RX ADMIN — LEVETIRACETAM 1000 MG: 500 TABLET, FILM COATED ORAL at 05:32

## 2023-03-19 NOTE — PROGRESS NOTES
"Patient asked to speak with supervisor notified Charge RN. Charge RN went to speak with patient. Flex RN assisted with med pass and answering call light.     1840: Patient out in hallway stating \"quit talking about me if you have something to say, say it to my face\" to other staff members in the [facility]. Patient requesting last names of nurses to report them. While charting outside room, patient on phone stating \"I can fucking see her standing there\" and becoming increasingly more agitated. Day and night charge RN notified.   "

## 2023-03-19 NOTE — PROGRESS NOTES
"Pt wondering around the [facility], agitated. When I asked how can I assist pt is shouting \"do your treat all your pts like this?\" When asked to elaborate pt reported nurses have \"been ignoring me and spreading rumors about me to everyone.\" I allowed pt to express feelings and talk about what she needed. Then pt calmly asks to \"speak to someone in charge\". Explained that no leadership is here but the charge nurse is here. I provided the pt with our unit manager's business card and pt thankful. Charge RN made aware.   "

## 2023-03-19 NOTE — DISCHARGE PLANNING
Approved services for medications: Atrovent, Budesonide, Protonix, Carafate. $41.46    0921 - Orders for home 02, face to face completed. Choice form faxed to [email or web address] to send referral to [facility]. RN made aware. Patient does not have home 02 currently, baseline RA.

## 2023-03-19 NOTE — CARE PLAN
The patient is Stable - Low risk of patient condition declining or worsening    Shift Goals  Clinical Goals: monitor oxygen, manage pain  Patient Goals: pain management  Family Goals: ODALYS      Problem: Knowledge Deficit - Standard  Goal: Patient and family/care givers will demonstrate understanding of plan of care, disease process/condition, diagnostic tests and medications  Outcome: Progressing     Problem: Pain - Standard  Goal: Alleviation of pain or a reduction in pain to the patient’s comfort goal  Outcome: Progressing       Progress made toward(s) clinical / shift goals:  Patient's oxygen saturation was monitored throughout the night, needing 3L o2 nasal cannula overnight. Patient's pain was assessed and managed throughout the night PRN and per MAR. Patient was able to sleep continuously for periods of the night.     Patient is not progressing towards the following goals:

## 2023-03-19 NOTE — PROGRESS NOTES
Handoff report received from day shift nurse and pt care assumed. Pt is currently resting in bed, A&Ox4 with reports of pain in her back and abdomen, on 3L o2 nasal cannula, and VSS. Patient is medical. Call light and belongings within reach, bed in lowest and locked position, fall precautions in place. Pt educated on use of call light and all needs addressed. Hourly rounding in place.

## 2023-03-19 NOTE — PROGRESS NOTES
Pt discharged to home with family.  AVS reviewed. No questions at this time.  Pt verbalizes understanding of O2 use and monitoring of SPO2.  Pt has home O2 tank.  Pt midline removed.

## 2023-03-19 NOTE — DISCHARGE SUMMARY
Discharge Summary    CHIEF COMPLAINT ON ADMISSION  No chief complaint on file.      Reason for Admission  GI Bleed     Admission Date  3/16/2023    CODE STATUS  Full Code    HPI & HOSPITAL COURSE  This is a 53 y.o. female here with melena hematemesis  Shaista Pichardo is a 53 y.o. female past medical history of COPD, epilepsy, depression/anxiety who presented 3/16/2023 at outside facility with melena and hematemesis for the last 3 days.  She does report occasional ibuprofen use.  She denies being on any blood thinners or any trauma.  Denies any fevers or chills.  Complaining of mild abdominal pain not relieved or exacerbated with anything.  In the ER at outside facility she was treated with IV antibiotics for colitis.  Her guaiac was positive.  EGD done at outside facility on 3/12/2023 was negative, general surgeon at [facility] believe patient needs GI.  Her hemoglobin has trended down from 16 on admission to 12.  Admitted to medicine service.  Patient was evaluated by gastroenterology the recommendations were appreciated.  Patient underwent upper endoscopy in addition to colonoscopy with findings significant for esophagitis and mild gastritis no active evidence of bleeding.  Details of procedure can be found below.  Patient was provided with all necessary medications in hand prior to discharge.  Furthermore patient was requiring mild amount of supplemental oxygen prior to discharge.  Supplemental oxygen was provided to her at bedside prior to discharge.  Patient will have follow-up with her primary care provider in addition to her GI physician in [place].  Therefore, she is discharged in good and stable condition to home with close outpatient follow-up.    The patient met 2-midnight criteria for an inpatient stay at the time of discharge.    Discharge Date  3/19/2023    FOLLOW UP ITEMS POST DISCHARGE  Take all medication as prescribed  Discontinue vaping    DISCHARGE DIAGNOSES  Principal Problem:    Melena  POA: Unknown  Active Problems:    Seizure disorder (HCC) POA: Yes    COPD with asthma (HCC) POA: Yes    Hematemesis POA: Unknown    Colitis POA: Unknown    Obesity (BMI 30.0-34.9) POA: Unknown    Esophagitis determined by endoscopy POA: Unknown    Erosive gastritis POA: Unknown  Resolved Problems:    GIB (gastrointestinal bleeding) POA: Yes      FOLLOW UP    Alverto Jerez M.D.  [address]  [facility] [address]  [phone]    Schedule an appointment as soon as possible for a visit  For hospital follow-up      MEDICATIONS ON DISCHARGE     Medication List        START taking these medications        Instructions   budesonide 0.25 MG/2ML Susp  Commonly known as: PULMICORT   Take 2 mL by nebulization 2 times a day for 30 days.  Dose: 0.25 mg     sucralfate 1 GM Tabs  Commonly known as: CARAFATE   Take 1 Tablet by mouth 4 Times a Day,Before Meals and at Bedtime.  Dose: 1 g            CONTINUE taking these medications        Instructions   Atrovent HFA 17 MCG/ACT Aers  Generic drug: ipratropium   Inhale 2 Puffs 4 times a day as needed (shortness of breath).  Dose: 2 Puff     lamoTRIgine 100 MG Tabs  Commonly known as: LAMICTAL   Take 100 mg by mouth every day.  Dose: 100 mg     levetiracetam 1000 MG tablet  Commonly known as: KEPPRA   Take 1,000 mg by mouth 2 times a day.  Dose: 1,000 mg     LORazepam 0.5 MG Tabs  Commonly known as: ATIVAN   Take 0.5 mg by mouth every 6 hours as needed for Anxiety.  Dose: 0.5 mg     mirtazapine 30 MG Tabs tablet  Commonly known as: Remeron   Take 30 mg by mouth every evening.  Dose: 30 mg     pantoprazole 40 MG Tbec  Commonly known as: PROTONIX   Take 1 Tablet by mouth every day.  Dose: 40 mg     propranolol 20 MG Tabs  Commonly known as: INDERAL   Take 20 mg by mouth 2 times a day.  Dose: 20 mg     traZODone 50 MG Tabs  Commonly known as: DESYREL   Take 50 mg by mouth every evening.  Dose: 50 mg              Allergies  Allergies   Allergen Reactions    Pork Allergy Hives     Toradol Hives    Penicillins      vomiting       DIET  Orders Placed This Encounter   Procedures    Diet Order Diet: Regular     Standing Status:   Standing     Number of Occurrences:   1     Order Specific Question:   Diet:     Answer:   Regular [1]       ACTIVITY  As tolerated.  Weight bearing as tolerated    CONSULTATIONS  Gastroenterology    PROCEDURES  3/18/2023:  PREOPERATIVE DIAGNOSES/INDICATIONS: Anemia, hematemesis     POSTOPERATIVE DIAGNOSIS: 1.  LA grade B esophagitis noted in the esophagus with some abrasions in the back of the pharynx likely causing the hematemesis.  2.  2 cm sliding hiatal hernia.  3.  Erosive gastritis seen in the prepyloric antrum otherwise normal stomach.  No active bleeding noted.  4.  Normal duodenum.  5.  Sigmoid diverticulosis.  Nonbleeding.  6.  Grade 2 nonbleeding internal hemorrhoids.    LABORATORY  Lab Results   Component Value Date    SODIUM 139 03/17/2023    POTASSIUM 3.7 03/17/2023    CHLORIDE 107 03/17/2023    CO2 22 03/17/2023    GLUCOSE 103 (H) 03/17/2023    BUN 3 (L) 03/17/2023    CREATININE 0.69 03/17/2023        Lab Results   Component Value Date    WBC 3.5 (L) 03/17/2023    HEMOGLOBIN 13.9 03/18/2023    HEMATOCRIT 41.1 03/18/2023    PLATELETCT 172 03/17/2023      Please note that this dictation was created using voice recognition software. I have made every reasonable attempt to correct obvious errors, but I expect that there are errors of grammar and possibly context that I did not discover before finalizing the note.    Total time of the discharge process exceeds 35 minutes.

## 2023-03-19 NOTE — DISCHARGE PLANNING
Received choice form @: 1115  Agency/Facility name: [facility]  Sent referral per choice form @: 1130  Referral sent to [facility] as per request. Spoke with on call service requested return call from  on call     Agency/Facility Name: [facility]  Spoke To: Ezekiel  Outcome: They have accepted patient on service and will be bedside in about 1 hour with equipment.

## 2023-03-22 ENCOUNTER — PATIENT OUTREACH (OUTPATIENT)
Dept: HEALTH INFORMATION MANAGEMENT | Facility: OTHER | Age: 53
End: 2023-03-22
Payer: MEDICAID

## 2023-03-22 NOTE — PROGRESS NOTES
CHW Destiny contacted pt post discharge to offer Community Care Management services. CHW was unable to reach pt. Pt's phone states it is disconnected. CHW called pts approved contacts and was unable to reach them due to phone being disconnected or mail box being full. CHW will not continue to follow at this time due to not being able to reach pt or HIPAA approved contacts.

## (undated) DEVICE — CANISTER SUCTION RIGID RED 1500CC (40EA/CA)

## (undated) DEVICE — MASK PANORAMIC OXYGEN PRO2 (30EA/CA)

## (undated) DEVICE — KIT PROCEDURE DOUBLE ENDO ONLY (5/CA)

## (undated) DEVICE — WATER IRRIGATION STERILE 1000ML (12EA/CA)

## (undated) DEVICE — MANIFOLD NEPTUNE 1 PORT (20/PK)

## (undated) DEVICE — ELECTRODE 850 FOAM ADHESIVE - HYDROGEL RADIOTRNSPRNT (50/PK)

## (undated) DEVICE — FILM CASSETTE ENDO

## (undated) DEVICE — TUBE CONNECTING SUCTION - CLEAR PLASTIC STERILE 72 IN (50EA/CA)

## (undated) DEVICE — TOWEL STOP TIMEOUT SAFETY FLAG (40EA/CA)

## (undated) DEVICE — SODIUM CHL IRRIGATION 0.9% 1000ML (12EA/CA)

## (undated) DEVICE — BITE BLOCK, DISP.

## (undated) DEVICE — SET LEADWIRE 5 LEAD BEDSIDE DISPOSABLE ECG (1SET OF 5/EA)